# Patient Record
Sex: MALE | Race: WHITE | NOT HISPANIC OR LATINO | Employment: FULL TIME | URBAN - METROPOLITAN AREA
[De-identification: names, ages, dates, MRNs, and addresses within clinical notes are randomized per-mention and may not be internally consistent; named-entity substitution may affect disease eponyms.]

---

## 2017-01-18 ENCOUNTER — COMMUNICATION - HEALTHEAST (OUTPATIENT)
Dept: INTERNAL MEDICINE | Facility: CLINIC | Age: 37
End: 2017-01-18

## 2017-01-18 DIAGNOSIS — E11.9 TYPE 2 DIABETES MELLITUS (H): ICD-10-CM

## 2017-01-22 ENCOUNTER — COMMUNICATION - HEALTHEAST (OUTPATIENT)
Dept: INTERNAL MEDICINE | Facility: CLINIC | Age: 37
End: 2017-01-22

## 2017-01-22 DIAGNOSIS — E11.9 DIABETES (H): ICD-10-CM

## 2017-01-23 ENCOUNTER — COMMUNICATION - HEALTHEAST (OUTPATIENT)
Dept: INTERNAL MEDICINE | Facility: CLINIC | Age: 37
End: 2017-01-23

## 2017-01-23 DIAGNOSIS — E11.9 DIABETES (H): ICD-10-CM

## 2017-01-23 DIAGNOSIS — E11.9 TYPE 2 DIABETES MELLITUS (H): ICD-10-CM

## 2017-03-01 ENCOUNTER — OFFICE VISIT - HEALTHEAST (OUTPATIENT)
Dept: FAMILY MEDICINE | Facility: CLINIC | Age: 37
End: 2017-03-01

## 2017-03-01 ENCOUNTER — COMMUNICATION - HEALTHEAST (OUTPATIENT)
Dept: FAMILY MEDICINE | Facility: CLINIC | Age: 37
End: 2017-03-01

## 2017-03-01 DIAGNOSIS — E11.9 TYPE 2 DIABETES MELLITUS WITHOUT COMPLICATION, WITHOUT LONG-TERM CURRENT USE OF INSULIN (H): ICD-10-CM

## 2017-03-01 DIAGNOSIS — Z23 NEED FOR VACCINATION: ICD-10-CM

## 2017-03-01 LAB — HBA1C MFR BLD: 6.5 % (ref 3.5–6)

## 2017-03-08 ENCOUNTER — OFFICE VISIT - HEALTHEAST (OUTPATIENT)
Dept: NURSING | Facility: CLINIC | Age: 37
End: 2017-03-08

## 2017-03-08 DIAGNOSIS — E11.9 TYPE 2 DIABETES MELLITUS WITHOUT COMPLICATION, WITHOUT LONG-TERM CURRENT USE OF INSULIN (H): ICD-10-CM

## 2017-03-13 ENCOUNTER — COMMUNICATION - HEALTHEAST (OUTPATIENT)
Dept: INTERNAL MEDICINE | Facility: CLINIC | Age: 37
End: 2017-03-13

## 2017-03-13 DIAGNOSIS — E11.9 TYPE 2 DIABETES MELLITUS (H): ICD-10-CM

## 2017-04-10 ENCOUNTER — COMMUNICATION - HEALTHEAST (OUTPATIENT)
Dept: FAMILY MEDICINE | Facility: CLINIC | Age: 37
End: 2017-04-10

## 2017-04-10 DIAGNOSIS — E11.9 DIABETES (H): ICD-10-CM

## 2017-04-17 ENCOUNTER — AMBULATORY - HEALTHEAST (OUTPATIENT)
Dept: FAMILY MEDICINE | Facility: CLINIC | Age: 37
End: 2017-04-17

## 2017-04-17 ENCOUNTER — COMMUNICATION - HEALTHEAST (OUTPATIENT)
Dept: LAB | Facility: CLINIC | Age: 37
End: 2017-04-17

## 2017-04-17 DIAGNOSIS — E11.9 DIABETES (H): ICD-10-CM

## 2017-05-26 ENCOUNTER — COMMUNICATION - HEALTHEAST (OUTPATIENT)
Dept: INTERNAL MEDICINE | Facility: CLINIC | Age: 37
End: 2017-05-26

## 2017-05-26 DIAGNOSIS — E11.9 TYPE 2 DIABETES MELLITUS (H): ICD-10-CM

## 2017-06-05 ENCOUNTER — AMBULATORY - HEALTHEAST (OUTPATIENT)
Dept: LAB | Facility: CLINIC | Age: 37
End: 2017-06-05

## 2017-06-05 DIAGNOSIS — E11.9 DIABETES (H): ICD-10-CM

## 2017-06-05 LAB — HBA1C MFR BLD: 6.4 % (ref 3.5–6)

## 2017-08-15 ENCOUNTER — COMMUNICATION - HEALTHEAST (OUTPATIENT)
Dept: INTERNAL MEDICINE | Facility: CLINIC | Age: 37
End: 2017-08-15

## 2017-08-15 DIAGNOSIS — E11.9 TYPE 2 DIABETES MELLITUS (H): ICD-10-CM

## 2017-09-04 ENCOUNTER — COMMUNICATION - HEALTHEAST (OUTPATIENT)
Dept: SCHEDULING | Facility: CLINIC | Age: 37
End: 2017-09-04

## 2017-09-06 ENCOUNTER — COMMUNICATION - HEALTHEAST (OUTPATIENT)
Dept: NURSING | Facility: CLINIC | Age: 37
End: 2017-09-06

## 2017-09-06 ENCOUNTER — OFFICE VISIT - HEALTHEAST (OUTPATIENT)
Dept: FAMILY MEDICINE | Facility: CLINIC | Age: 37
End: 2017-09-06

## 2017-09-06 DIAGNOSIS — E11.9 TYPE 2 DIABETES MELLITUS WITHOUT COMPLICATION, WITHOUT LONG-TERM CURRENT USE OF INSULIN (H): ICD-10-CM

## 2017-09-06 DIAGNOSIS — G51.0 BELL'S PALSY: ICD-10-CM

## 2017-09-06 LAB — HBA1C MFR BLD: 6.5 % (ref 3.5–6)

## 2017-09-06 ASSESSMENT — MIFFLIN-ST. JEOR: SCORE: 2039.81

## 2017-09-07 LAB — HSV AB SCREEN IGM S BY EIA: REACTIVE

## 2017-09-08 LAB — HSV AB, IGM, S BY IFA - HISTORICAL: NEGATIVE

## 2017-09-12 ENCOUNTER — COMMUNICATION - HEALTHEAST (OUTPATIENT)
Dept: FAMILY MEDICINE | Facility: CLINIC | Age: 37
End: 2017-09-12

## 2017-09-13 ENCOUNTER — OFFICE VISIT - HEALTHEAST (OUTPATIENT)
Dept: FAMILY MEDICINE | Facility: CLINIC | Age: 37
End: 2017-09-13

## 2017-09-13 ENCOUNTER — OFFICE VISIT - HEALTHEAST (OUTPATIENT)
Dept: NURSING | Facility: CLINIC | Age: 37
End: 2017-09-13

## 2017-09-13 DIAGNOSIS — E11.9 TYPE 2 DIABETES MELLITUS WITHOUT COMPLICATION, WITHOUT LONG-TERM CURRENT USE OF INSULIN (H): ICD-10-CM

## 2017-09-13 DIAGNOSIS — G51.0 BELL'S PALSY: ICD-10-CM

## 2017-09-13 ASSESSMENT — MIFFLIN-ST. JEOR: SCORE: 1998.08

## 2017-10-27 ENCOUNTER — COMMUNICATION - HEALTHEAST (OUTPATIENT)
Dept: FAMILY MEDICINE | Facility: CLINIC | Age: 37
End: 2017-10-27

## 2017-10-30 ENCOUNTER — COMMUNICATION - HEALTHEAST (OUTPATIENT)
Dept: FAMILY MEDICINE | Facility: CLINIC | Age: 37
End: 2017-10-30

## 2017-10-30 DIAGNOSIS — E11.9 DIABETES (H): ICD-10-CM

## 2017-11-07 ENCOUNTER — COMMUNICATION - HEALTHEAST (OUTPATIENT)
Dept: INTERNAL MEDICINE | Facility: CLINIC | Age: 37
End: 2017-11-07

## 2017-11-07 DIAGNOSIS — E11.9 TYPE 2 DIABETES MELLITUS (H): ICD-10-CM

## 2018-01-11 ENCOUNTER — OFFICE VISIT - HEALTHEAST (OUTPATIENT)
Dept: FAMILY MEDICINE | Facility: CLINIC | Age: 38
End: 2018-01-11

## 2018-01-11 DIAGNOSIS — J10.1 INFLUENZA A: ICD-10-CM

## 2018-01-11 LAB
FLUAV AG SPEC QL IA: ABNORMAL
FLUBV AG SPEC QL IA: ABNORMAL

## 2018-01-11 ASSESSMENT — MIFFLIN-ST. JEOR: SCORE: 2039.81

## 2018-01-27 ENCOUNTER — OFFICE VISIT - HEALTHEAST (OUTPATIENT)
Dept: FAMILY MEDICINE | Facility: CLINIC | Age: 38
End: 2018-01-27

## 2018-01-27 DIAGNOSIS — R52 BODY ACHES: ICD-10-CM

## 2018-01-27 LAB
FLUAV AG SPEC QL IA: NORMAL
FLUBV AG SPEC QL IA: NORMAL

## 2018-01-29 ENCOUNTER — COMMUNICATION - HEALTHEAST (OUTPATIENT)
Dept: INTERNAL MEDICINE | Facility: CLINIC | Age: 38
End: 2018-01-29

## 2018-01-29 DIAGNOSIS — E11.9 DIABETES (H): ICD-10-CM

## 2018-01-29 DIAGNOSIS — E11.9 TYPE 2 DIABETES MELLITUS (H): ICD-10-CM

## 2018-02-05 ENCOUNTER — OFFICE VISIT - HEALTHEAST (OUTPATIENT)
Dept: NURSING | Facility: CLINIC | Age: 38
End: 2018-02-05

## 2018-02-05 ENCOUNTER — OFFICE VISIT - HEALTHEAST (OUTPATIENT)
Dept: FAMILY MEDICINE | Facility: CLINIC | Age: 38
End: 2018-02-05

## 2018-02-05 DIAGNOSIS — E11.9 TYPE 2 DIABETES MELLITUS WITHOUT COMPLICATION, WITHOUT LONG-TERM CURRENT USE OF INSULIN (H): ICD-10-CM

## 2018-02-05 DIAGNOSIS — E55.9 VITAMIN D DEFICIENCY: ICD-10-CM

## 2018-02-05 LAB
ALBUMIN SERPL-MCNC: 3.9 G/DL (ref 3.5–5)
ALP SERPL-CCNC: 55 U/L (ref 45–120)
ALT SERPL W P-5'-P-CCNC: 55 U/L (ref 0–45)
ANION GAP SERPL CALCULATED.3IONS-SCNC: 13 MMOL/L (ref 5–18)
AST SERPL W P-5'-P-CCNC: 30 U/L (ref 0–40)
BILIRUB SERPL-MCNC: 0.3 MG/DL (ref 0–1)
BUN SERPL-MCNC: 13 MG/DL (ref 8–22)
CALCIUM SERPL-MCNC: 9.5 MG/DL (ref 8.5–10.5)
CHLORIDE BLD-SCNC: 102 MMOL/L (ref 98–107)
CO2 SERPL-SCNC: 25 MMOL/L (ref 22–31)
CREAT SERPL-MCNC: 0.75 MG/DL (ref 0.7–1.3)
CREAT UR-MCNC: 57.3 MG/DL
GFR SERPL CREATININE-BSD FRML MDRD: >60 ML/MIN/1.73M2
GLUCOSE BLD-MCNC: 114 MG/DL (ref 70–125)
HBA1C MFR BLD: 6.5 % (ref 3.5–6)
MICROALBUMIN UR-MCNC: <0.5 MG/DL (ref 0–1.99)
MICROALBUMIN/CREAT UR: NORMAL MG/G
POTASSIUM BLD-SCNC: 4.3 MMOL/L (ref 3.5–5)
PROT SERPL-MCNC: 8.1 G/DL (ref 6–8)
SODIUM SERPL-SCNC: 140 MMOL/L (ref 136–145)

## 2018-02-05 ASSESSMENT — MIFFLIN-ST. JEOR: SCORE: 2046.62

## 2018-02-06 LAB
25(OH)D3 SERPL-MCNC: 30.6 NG/ML (ref 30–80)
25(OH)D3 SERPL-MCNC: 30.6 NG/ML (ref 30–80)

## 2018-02-13 ENCOUNTER — COMMUNICATION - HEALTHEAST (OUTPATIENT)
Dept: FAMILY MEDICINE | Facility: CLINIC | Age: 38
End: 2018-02-13

## 2018-03-27 ENCOUNTER — COMMUNICATION - HEALTHEAST (OUTPATIENT)
Dept: FAMILY MEDICINE | Facility: CLINIC | Age: 38
End: 2018-03-27

## 2018-04-03 ENCOUNTER — COMMUNICATION - HEALTHEAST (OUTPATIENT)
Dept: FAMILY MEDICINE | Facility: CLINIC | Age: 38
End: 2018-04-03

## 2018-04-05 ENCOUNTER — OFFICE VISIT - HEALTHEAST (OUTPATIENT)
Dept: FAMILY MEDICINE | Facility: CLINIC | Age: 38
End: 2018-04-05

## 2018-04-05 DIAGNOSIS — H93.8X2 EAR PRESSURE, LEFT: ICD-10-CM

## 2018-04-05 ASSESSMENT — MIFFLIN-ST. JEOR: SCORE: 2062.95

## 2018-04-06 ENCOUNTER — RECORDS - HEALTHEAST (OUTPATIENT)
Dept: ADMINISTRATIVE | Facility: OTHER | Age: 38
End: 2018-04-06

## 2018-04-25 ENCOUNTER — COMMUNICATION - HEALTHEAST (OUTPATIENT)
Dept: INTERNAL MEDICINE | Facility: CLINIC | Age: 38
End: 2018-04-25

## 2018-04-25 ENCOUNTER — COMMUNICATION - HEALTHEAST (OUTPATIENT)
Dept: FAMILY MEDICINE | Facility: CLINIC | Age: 38
End: 2018-04-25

## 2018-04-25 DIAGNOSIS — E11.9 TYPE 2 DIABETES MELLITUS (H): ICD-10-CM

## 2018-05-03 ENCOUNTER — COMMUNICATION - HEALTHEAST (OUTPATIENT)
Dept: INTERNAL MEDICINE | Facility: CLINIC | Age: 38
End: 2018-05-03

## 2018-05-03 DIAGNOSIS — E11.9 DIABETES (H): ICD-10-CM

## 2018-07-15 ENCOUNTER — COMMUNICATION - HEALTHEAST (OUTPATIENT)
Dept: INTERNAL MEDICINE | Facility: CLINIC | Age: 38
End: 2018-07-15

## 2018-07-15 DIAGNOSIS — E11.9 TYPE 2 DIABETES MELLITUS (H): ICD-10-CM

## 2018-08-02 ENCOUNTER — COMMUNICATION - HEALTHEAST (OUTPATIENT)
Dept: INTERNAL MEDICINE | Facility: CLINIC | Age: 38
End: 2018-08-02

## 2018-08-02 DIAGNOSIS — E11.9 DIABETES (H): ICD-10-CM

## 2018-08-08 ENCOUNTER — OFFICE VISIT - HEALTHEAST (OUTPATIENT)
Dept: PHARMACY | Facility: CLINIC | Age: 38
End: 2018-08-08

## 2018-08-08 ENCOUNTER — AMBULATORY - HEALTHEAST (OUTPATIENT)
Dept: LAB | Facility: CLINIC | Age: 38
End: 2018-08-08

## 2018-08-08 DIAGNOSIS — E55.9 VITAMIN D DEFICIENCY: ICD-10-CM

## 2018-08-08 DIAGNOSIS — E11.9 TYPE 2 DIABETES MELLITUS WITHOUT COMPLICATION, WITHOUT LONG-TERM CURRENT USE OF INSULIN (H): ICD-10-CM

## 2018-08-08 DIAGNOSIS — E11.9 TYPE 2 DIABETES MELLITUS (H): ICD-10-CM

## 2018-08-08 DIAGNOSIS — E11.9 DIABETES (H): ICD-10-CM

## 2018-08-08 LAB
ALBUMIN SERPL-MCNC: 3.7 G/DL (ref 3.5–5)
ALP SERPL-CCNC: 51 U/L (ref 45–120)
ALT SERPL W P-5'-P-CCNC: 19 U/L (ref 0–45)
ANION GAP SERPL CALCULATED.3IONS-SCNC: 11 MMOL/L (ref 5–18)
AST SERPL W P-5'-P-CCNC: 18 U/L (ref 0–40)
BILIRUB SERPL-MCNC: 0.4 MG/DL (ref 0–1)
BUN SERPL-MCNC: 18 MG/DL (ref 8–22)
CALCIUM SERPL-MCNC: 9.1 MG/DL (ref 8.5–10.5)
CHLORIDE BLD-SCNC: 104 MMOL/L (ref 98–107)
CHOLEST SERPL-MCNC: 178 MG/DL
CO2 SERPL-SCNC: 24 MMOL/L (ref 22–31)
CREAT SERPL-MCNC: 0.77 MG/DL (ref 0.7–1.3)
FASTING STATUS PATIENT QL REPORTED: YES
GFR SERPL CREATININE-BSD FRML MDRD: >60 ML/MIN/1.73M2
GLUCOSE BLD-MCNC: 118 MG/DL (ref 70–125)
HBA1C MFR BLD: 6.6 % (ref 3.5–6)
HDLC SERPL-MCNC: 57 MG/DL
LDLC SERPL CALC-MCNC: 112 MG/DL
POTASSIUM BLD-SCNC: 4.3 MMOL/L (ref 3.5–5)
PROT SERPL-MCNC: 7.1 G/DL (ref 6–8)
SODIUM SERPL-SCNC: 139 MMOL/L (ref 136–145)
TRIGL SERPL-MCNC: 43 MG/DL

## 2018-08-16 ENCOUNTER — OFFICE VISIT - HEALTHEAST (OUTPATIENT)
Dept: FAMILY MEDICINE | Facility: CLINIC | Age: 38
End: 2018-08-16

## 2018-08-16 DIAGNOSIS — E11.9 TYPE 2 DIABETES MELLITUS WITHOUT COMPLICATION, WITHOUT LONG-TERM CURRENT USE OF INSULIN (H): ICD-10-CM

## 2018-08-16 ASSESSMENT — MIFFLIN-ST. JEOR: SCORE: 2057.5

## 2018-08-29 ENCOUNTER — OFFICE VISIT - HEALTHEAST (OUTPATIENT)
Dept: FAMILY MEDICINE | Facility: CLINIC | Age: 38
End: 2018-08-29

## 2018-08-29 DIAGNOSIS — R52 BODY ACHES: ICD-10-CM

## 2018-08-29 DIAGNOSIS — G44.009 CLUSTER HEADACHES: ICD-10-CM

## 2018-08-29 LAB
BASOPHILS # BLD AUTO: 0.1 THOU/UL (ref 0–0.2)
BASOPHILS NFR BLD AUTO: 1 % (ref 0–2)
EOSINOPHIL # BLD AUTO: 0.1 THOU/UL (ref 0–0.4)
EOSINOPHIL NFR BLD AUTO: 1 % (ref 0–6)
ERYTHROCYTE [DISTWIDTH] IN BLOOD BY AUTOMATED COUNT: 12.8 % (ref 11–14.5)
HCT VFR BLD AUTO: 45.3 % (ref 40–54)
HGB BLD-MCNC: 15.5 G/DL (ref 14–18)
LYMPHOCYTES # BLD AUTO: 1.2 THOU/UL (ref 0.8–4.4)
LYMPHOCYTES NFR BLD AUTO: 25 % (ref 20–40)
MCH RBC QN AUTO: 29.8 PG (ref 27–34)
MCHC RBC AUTO-ENTMCNC: 34.3 G/DL (ref 32–36)
MCV RBC AUTO: 87 FL (ref 80–100)
MONOCYTES # BLD AUTO: 0.7 THOU/UL (ref 0–0.9)
MONOCYTES NFR BLD AUTO: 16 % (ref 2–10)
NEUTROPHILS # BLD AUTO: 2.7 THOU/UL (ref 2–7.7)
NEUTROPHILS NFR BLD AUTO: 57 % (ref 50–70)
PLATELET # BLD AUTO: 221 THOU/UL (ref 140–440)
PMV BLD AUTO: 7 FL (ref 7–10)
RBC # BLD AUTO: 5.22 MILL/UL (ref 4.4–6.2)
TSH SERPL DL<=0.005 MIU/L-ACNC: 2.01 UIU/ML (ref 0.3–5)
WBC: 4.7 THOU/UL (ref 4–11)

## 2018-08-29 ASSESSMENT — MIFFLIN-ST. JEOR: SCORE: 2049.79

## 2018-08-30 LAB
25(OH)D3 SERPL-MCNC: 29.6 NG/ML (ref 30–80)
25(OH)D3 SERPL-MCNC: 29.6 NG/ML (ref 30–80)
LYME TOTAL ANTIBODY - HISTORICAL: 0.07 INDEX VALUE

## 2018-10-09 ENCOUNTER — COMMUNICATION - HEALTHEAST (OUTPATIENT)
Dept: INTERNAL MEDICINE | Facility: CLINIC | Age: 38
End: 2018-10-09

## 2018-10-09 DIAGNOSIS — E11.9 TYPE 2 DIABETES MELLITUS (H): ICD-10-CM

## 2018-12-11 ENCOUNTER — AMBULATORY - HEALTHEAST (OUTPATIENT)
Dept: FAMILY MEDICINE | Facility: CLINIC | Age: 38
End: 2018-12-11

## 2019-01-15 ENCOUNTER — OFFICE VISIT - HEALTHEAST (OUTPATIENT)
Dept: FAMILY MEDICINE | Facility: CLINIC | Age: 39
End: 2019-01-15

## 2019-01-15 DIAGNOSIS — J01.10 ACUTE FRONTAL SINUSITIS, RECURRENCE NOT SPECIFIED: ICD-10-CM

## 2019-02-13 ENCOUNTER — OFFICE VISIT - HEALTHEAST (OUTPATIENT)
Dept: FAMILY MEDICINE | Facility: CLINIC | Age: 39
End: 2019-02-13

## 2019-02-13 ENCOUNTER — OFFICE VISIT - HEALTHEAST (OUTPATIENT)
Dept: PHARMACY | Facility: CLINIC | Age: 39
End: 2019-02-13

## 2019-02-13 DIAGNOSIS — E11.9 TYPE 2 DIABETES MELLITUS WITHOUT COMPLICATION, WITHOUT LONG-TERM CURRENT USE OF INSULIN (H): ICD-10-CM

## 2019-02-13 DIAGNOSIS — E55.9 VITAMIN D DEFICIENCY: ICD-10-CM

## 2019-02-13 DIAGNOSIS — E66.01 MORBID OBESITY (H): ICD-10-CM

## 2019-02-13 LAB
CREAT UR-MCNC: 78 MG/DL
HBA1C MFR BLD: 7.7 % (ref 3.5–6)
MICROALBUMIN UR-MCNC: <0.5 MG/DL (ref 0–1.99)
MICROALBUMIN/CREAT UR: NORMAL MG/G

## 2019-04-05 ENCOUNTER — COMMUNICATION - HEALTHEAST (OUTPATIENT)
Dept: SCHEDULING | Facility: CLINIC | Age: 39
End: 2019-04-05

## 2019-04-05 ENCOUNTER — OFFICE VISIT - HEALTHEAST (OUTPATIENT)
Dept: FAMILY MEDICINE | Facility: CLINIC | Age: 39
End: 2019-04-05

## 2019-04-05 DIAGNOSIS — M25.511 ACUTE PAIN OF RIGHT SHOULDER: ICD-10-CM

## 2019-04-05 ASSESSMENT — MIFFLIN-ST. JEOR: SCORE: 2055.24

## 2019-05-13 ENCOUNTER — COMMUNICATION - HEALTHEAST (OUTPATIENT)
Dept: FAMILY MEDICINE | Facility: CLINIC | Age: 39
End: 2019-05-13

## 2019-05-18 ENCOUNTER — COMMUNICATION - HEALTHEAST (OUTPATIENT)
Dept: FAMILY MEDICINE | Facility: CLINIC | Age: 39
End: 2019-05-18

## 2019-05-18 DIAGNOSIS — E11.9 TYPE 2 DIABETES MELLITUS WITHOUT COMPLICATION, WITHOUT LONG-TERM CURRENT USE OF INSULIN (H): ICD-10-CM

## 2019-06-17 ENCOUNTER — OFFICE VISIT - HEALTHEAST (OUTPATIENT)
Dept: PHARMACY | Facility: CLINIC | Age: 39
End: 2019-06-17

## 2019-06-17 ENCOUNTER — OFFICE VISIT - HEALTHEAST (OUTPATIENT)
Dept: FAMILY MEDICINE | Facility: CLINIC | Age: 39
End: 2019-06-17

## 2019-06-17 DIAGNOSIS — E11.9 TYPE 2 DIABETES MELLITUS WITHOUT COMPLICATION, WITHOUT LONG-TERM CURRENT USE OF INSULIN (H): ICD-10-CM

## 2019-06-17 DIAGNOSIS — E55.9 VITAMIN D DEFICIENCY: ICD-10-CM

## 2019-06-17 DIAGNOSIS — E66.01 MORBID OBESITY (H): ICD-10-CM

## 2019-06-17 DIAGNOSIS — Z00.00 ANNUAL PHYSICAL EXAM: ICD-10-CM

## 2019-06-17 LAB
ALBUMIN SERPL-MCNC: 4.1 G/DL (ref 3.5–5)
ALP SERPL-CCNC: 50 U/L (ref 45–120)
ALT SERPL W P-5'-P-CCNC: 25 U/L (ref 0–45)
ANION GAP SERPL CALCULATED.3IONS-SCNC: 11 MMOL/L (ref 5–18)
AST SERPL W P-5'-P-CCNC: 22 U/L (ref 0–40)
BILIRUB SERPL-MCNC: 0.4 MG/DL (ref 0–1)
BUN SERPL-MCNC: 13 MG/DL (ref 8–22)
CALCIUM SERPL-MCNC: 9.9 MG/DL (ref 8.5–10.5)
CHLORIDE BLD-SCNC: 103 MMOL/L (ref 98–107)
CHOLEST SERPL-MCNC: 197 MG/DL
CO2 SERPL-SCNC: 25 MMOL/L (ref 22–31)
CREAT SERPL-MCNC: 0.85 MG/DL (ref 0.7–1.3)
CREAT UR-MCNC: 46.2 MG/DL
FASTING STATUS PATIENT QL REPORTED: YES
GFR SERPL CREATININE-BSD FRML MDRD: >60 ML/MIN/1.73M2
GLUCOSE BLD-MCNC: 111 MG/DL (ref 70–125)
HBA1C MFR BLD: 7 % (ref 3.5–6)
HDLC SERPL-MCNC: 64 MG/DL
HGB BLD-MCNC: 14.7 G/DL (ref 14–18)
LDLC SERPL CALC-MCNC: 120 MG/DL
MICROALBUMIN UR-MCNC: <0.5 MG/DL (ref 0–1.99)
MICROALBUMIN/CREAT UR: NORMAL MG/G
POTASSIUM BLD-SCNC: 4.3 MMOL/L (ref 3.5–5)
PROT SERPL-MCNC: 8 G/DL (ref 6–8)
SODIUM SERPL-SCNC: 139 MMOL/L (ref 136–145)
TRIGL SERPL-MCNC: 65 MG/DL
TSH SERPL DL<=0.005 MIU/L-ACNC: 2.07 UIU/ML (ref 0.3–5)

## 2019-06-17 ASSESSMENT — MIFFLIN-ST. JEOR: SCORE: 2052.51

## 2019-06-18 LAB
25(OH)D3 SERPL-MCNC: 37.6 NG/ML (ref 30–80)
25(OH)D3 SERPL-MCNC: 37.6 NG/ML (ref 30–80)

## 2019-08-05 ENCOUNTER — COMMUNICATION - HEALTHEAST (OUTPATIENT)
Dept: FAMILY MEDICINE | Facility: CLINIC | Age: 39
End: 2019-08-05

## 2019-09-26 ENCOUNTER — COMMUNICATION - HEALTHEAST (OUTPATIENT)
Dept: NURSING | Facility: CLINIC | Age: 39
End: 2019-09-26

## 2019-10-07 ENCOUNTER — COMMUNICATION - HEALTHEAST (OUTPATIENT)
Dept: FAMILY MEDICINE | Facility: CLINIC | Age: 39
End: 2019-10-07

## 2019-10-07 DIAGNOSIS — E11.9 TYPE 2 DIABETES MELLITUS (H): ICD-10-CM

## 2019-10-09 ENCOUNTER — AMBULATORY - HEALTHEAST (OUTPATIENT)
Dept: NURSING | Facility: CLINIC | Age: 39
End: 2019-10-09

## 2019-12-02 ENCOUNTER — COMMUNICATION - HEALTHEAST (OUTPATIENT)
Dept: FAMILY MEDICINE | Facility: CLINIC | Age: 39
End: 2019-12-02

## 2019-12-02 DIAGNOSIS — E11.9 DIABETES (H): ICD-10-CM

## 2019-12-31 ENCOUNTER — COMMUNICATION - HEALTHEAST (OUTPATIENT)
Dept: NURSING | Facility: CLINIC | Age: 39
End: 2019-12-31

## 2020-01-15 ENCOUNTER — OFFICE VISIT - HEALTHEAST (OUTPATIENT)
Dept: FAMILY MEDICINE | Facility: CLINIC | Age: 40
End: 2020-01-15

## 2020-01-15 DIAGNOSIS — E11.9 TYPE 2 DIABETES MELLITUS (H): ICD-10-CM

## 2020-01-15 DIAGNOSIS — E66.01 MORBID OBESITY (H): ICD-10-CM

## 2020-01-15 LAB
ALBUMIN SERPL-MCNC: 4.1 G/DL (ref 3.5–5)
ALP SERPL-CCNC: 56 U/L (ref 45–120)
ALT SERPL W P-5'-P-CCNC: 28 U/L (ref 0–45)
ANION GAP SERPL CALCULATED.3IONS-SCNC: 11 MMOL/L (ref 5–18)
AST SERPL W P-5'-P-CCNC: 25 U/L (ref 0–40)
BILIRUB SERPL-MCNC: 0.6 MG/DL (ref 0–1)
BUN SERPL-MCNC: 14 MG/DL (ref 8–22)
CALCIUM SERPL-MCNC: 9.6 MG/DL (ref 8.5–10.5)
CHLORIDE BLD-SCNC: 103 MMOL/L (ref 98–107)
CHOLEST SERPL-MCNC: 194 MG/DL
CO2 SERPL-SCNC: 26 MMOL/L (ref 22–31)
CREAT SERPL-MCNC: 0.89 MG/DL (ref 0.7–1.3)
CREAT UR-MCNC: 133.3 MG/DL
FASTING STATUS PATIENT QL REPORTED: YES
GFR SERPL CREATININE-BSD FRML MDRD: >60 ML/MIN/1.73M2
GLUCOSE BLD-MCNC: 128 MG/DL (ref 70–125)
HDLC SERPL-MCNC: 67 MG/DL
LDLC SERPL CALC-MCNC: 114 MG/DL
MICROALBUMIN UR-MCNC: 0.74 MG/DL (ref 0–1.99)
MICROALBUMIN/CREAT UR: 5.6 MG/G
POTASSIUM BLD-SCNC: 4.7 MMOL/L (ref 3.5–5)
PROT SERPL-MCNC: 7.8 G/DL (ref 6–8)
SODIUM SERPL-SCNC: 140 MMOL/L (ref 136–145)
TRIGL SERPL-MCNC: 64 MG/DL

## 2020-02-05 ENCOUNTER — COMMUNICATION - HEALTHEAST (OUTPATIENT)
Dept: FAMILY MEDICINE | Facility: CLINIC | Age: 40
End: 2020-02-05

## 2020-02-05 DIAGNOSIS — E11.9 TYPE 2 DIABETES MELLITUS WITHOUT COMPLICATION, WITHOUT LONG-TERM CURRENT USE OF INSULIN (H): ICD-10-CM

## 2020-03-16 ENCOUNTER — COMMUNICATION - HEALTHEAST (OUTPATIENT)
Dept: FAMILY MEDICINE | Facility: CLINIC | Age: 40
End: 2020-03-16

## 2020-03-23 ENCOUNTER — COMMUNICATION - HEALTHEAST (OUTPATIENT)
Dept: PHARMACY | Facility: CLINIC | Age: 40
End: 2020-03-23

## 2020-03-30 ENCOUNTER — AMBULATORY - HEALTHEAST (OUTPATIENT)
Dept: PHARMACY | Facility: CLINIC | Age: 40
End: 2020-03-30

## 2020-03-30 DIAGNOSIS — E11.9 TYPE 2 DIABETES MELLITUS WITHOUT COMPLICATION, WITHOUT LONG-TERM CURRENT USE OF INSULIN (H): ICD-10-CM

## 2020-03-30 DIAGNOSIS — E55.9 VITAMIN D DEFICIENCY: ICD-10-CM

## 2020-07-10 ENCOUNTER — COMMUNICATION - HEALTHEAST (OUTPATIENT)
Dept: FAMILY MEDICINE | Facility: CLINIC | Age: 40
End: 2020-07-10

## 2020-07-14 ENCOUNTER — COMMUNICATION - HEALTHEAST (OUTPATIENT)
Dept: NURSING | Facility: CLINIC | Age: 40
End: 2020-07-14

## 2020-07-15 ENCOUNTER — COMMUNICATION - HEALTHEAST (OUTPATIENT)
Dept: NURSING | Facility: CLINIC | Age: 40
End: 2020-07-15

## 2020-07-15 ENCOUNTER — OFFICE VISIT - HEALTHEAST (OUTPATIENT)
Dept: FAMILY MEDICINE | Facility: CLINIC | Age: 40
End: 2020-07-15

## 2020-07-15 DIAGNOSIS — E11.9 TYPE 2 DIABETES MELLITUS WITHOUT COMPLICATION, WITHOUT LONG-TERM CURRENT USE OF INSULIN (H): ICD-10-CM

## 2020-07-15 LAB
ANION GAP SERPL CALCULATED.3IONS-SCNC: 10 MMOL/L (ref 5–18)
BUN SERPL-MCNC: 14 MG/DL (ref 8–22)
CALCIUM SERPL-MCNC: 9.2 MG/DL (ref 8.5–10.5)
CHLORIDE BLD-SCNC: 102 MMOL/L (ref 98–107)
CO2 SERPL-SCNC: 25 MMOL/L (ref 22–31)
CREAT SERPL-MCNC: 0.85 MG/DL (ref 0.7–1.3)
CREAT UR-MCNC: 68.4 MG/DL
CREAT UR-MCNC: 68.4 MG/DL
GFR SERPL CREATININE-BSD FRML MDRD: >60 ML/MIN/1.73M2
GLUCOSE BLD-MCNC: 120 MG/DL (ref 70–125)
HBA1C MFR BLD: 6.7 % (ref 3.5–6)
MICROALBUMIN UR-MCNC: <0.5 MG/DL (ref 0–1.99)
MICROALBUMIN/CREAT UR: NORMAL MG/G{CREAT}
POTASSIUM BLD-SCNC: 4 MMOL/L (ref 3.5–5)
PROTEIN, RANDOM URINE - HISTORICAL: <7 MG/DL
PROTEIN/CREAT RATIO, RANDOM UR: NORMAL
SODIUM SERPL-SCNC: 137 MMOL/L (ref 136–145)

## 2020-09-17 ENCOUNTER — OFFICE VISIT - HEALTHEAST (OUTPATIENT)
Dept: FAMILY MEDICINE | Facility: CLINIC | Age: 40
End: 2020-09-17

## 2020-09-17 DIAGNOSIS — L72.3 SEBACEOUS CYST: ICD-10-CM

## 2020-09-17 DIAGNOSIS — Z23 NEED FOR VACCINATION: ICD-10-CM

## 2020-11-17 ENCOUNTER — COMMUNICATION - HEALTHEAST (OUTPATIENT)
Dept: FAMILY MEDICINE | Facility: CLINIC | Age: 40
End: 2020-11-17

## 2020-12-22 ENCOUNTER — OFFICE VISIT - HEALTHEAST (OUTPATIENT)
Dept: FAMILY MEDICINE | Facility: CLINIC | Age: 40
End: 2020-12-22

## 2020-12-22 DIAGNOSIS — E11.9 TYPE 2 DIABETES MELLITUS WITHOUT COMPLICATION, WITHOUT LONG-TERM CURRENT USE OF INSULIN (H): ICD-10-CM

## 2020-12-22 LAB — HBA1C MFR BLD: 7.1 %

## 2021-02-05 ENCOUNTER — COMMUNICATION - HEALTHEAST (OUTPATIENT)
Dept: FAMILY MEDICINE | Facility: CLINIC | Age: 41
End: 2021-02-05

## 2021-02-05 DIAGNOSIS — E66.01 MORBID OBESITY (H): ICD-10-CM

## 2021-02-05 DIAGNOSIS — E11.9 TYPE 2 DIABETES MELLITUS (H): ICD-10-CM

## 2021-02-22 ENCOUNTER — COMMUNICATION - HEALTHEAST (OUTPATIENT)
Dept: FAMILY MEDICINE | Facility: CLINIC | Age: 41
End: 2021-02-22

## 2021-02-22 DIAGNOSIS — E66.01 MORBID OBESITY (H): ICD-10-CM

## 2021-03-19 ENCOUNTER — COMMUNICATION - HEALTHEAST (OUTPATIENT)
Dept: FAMILY MEDICINE | Facility: CLINIC | Age: 41
End: 2021-03-19

## 2021-03-19 DIAGNOSIS — E11.9 TYPE 2 DIABETES MELLITUS WITHOUT COMPLICATION, WITHOUT LONG-TERM CURRENT USE OF INSULIN (H): ICD-10-CM

## 2021-03-24 ENCOUNTER — COMMUNICATION - HEALTHEAST (OUTPATIENT)
Dept: FAMILY MEDICINE | Facility: CLINIC | Age: 41
End: 2021-03-24

## 2021-03-24 DIAGNOSIS — E11.9 TYPE 2 DIABETES MELLITUS WITHOUT COMPLICATION, WITHOUT LONG-TERM CURRENT USE OF INSULIN (H): ICD-10-CM

## 2021-05-05 ENCOUNTER — COMMUNICATION - HEALTHEAST (OUTPATIENT)
Dept: FAMILY MEDICINE | Facility: CLINIC | Age: 41
End: 2021-05-05

## 2021-05-05 DIAGNOSIS — E11.9 TYPE 2 DIABETES MELLITUS (H): ICD-10-CM

## 2021-05-05 DIAGNOSIS — E66.01 MORBID OBESITY (H): ICD-10-CM

## 2021-05-26 ENCOUNTER — RECORDS - HEALTHEAST (OUTPATIENT)
Dept: ADMINISTRATIVE | Facility: CLINIC | Age: 41
End: 2021-05-26

## 2021-05-27 NOTE — TELEPHONE ENCOUNTER
Pt says his Rt arm feeling a little tingly in fingers and upper arm elbow to shoulder and bicep the pain is a 6/10 and hard to lift arm above shoulder level x 2 days  The pain is constant  Tingling comes and goes in ring finger and middle finger mostly  600 mg Ibuprofen doesn't help. Pt took it a few times now without any benefit  Tried ice and didn't help  No injury noted  No redness or swelling  No chest pains noted  Recommend appt and transferred to scheduling  No opening at [facility]-ok to [facility] too    Anabelle Christopher, RN [facility] RN Triage      Reason for Disposition    Patient wants to be seen    MODERATE pain (e.g., interferes with normal activities) and present > 3 days    Protocols used: SHOULDER PAIN-A-OH

## 2021-05-27 NOTE — PROGRESS NOTES
1. Acute pain of right shoulder  XR Shoulder Right 2 or More VWS        Plan: I reassured him that I do not see anything bony on the x-ray that is of concern.  I think he might have just had a strain of his rotator cuff.  I showed him some pendulum exercises that he can do on a daily basis.  He should continue to take 6-800 mg of ibuprofen up to 3 times daily.  He should use heat on the shoulder as well.  I told him that is not getting better in a couple of weeks he could consider letting us know and he can either let promise or myself know and would consider doing an MRI at that time to further assess his rotator cuff.  I think it will be a lot better by then however but he will let us know if he seems to be getting worse.    Subjective: 38-year-old male who is here today with some shoulder pain on the right side.  He states this been bothering him for a couple of days.  He notes of no specific injury in the last couple of days.  It hurts more when he lays on that right side.  He is left-handed.  He states that the pain is mostly the anterior shoulder but will radiate down toward his elbow.  Once in a while, he will get some numbness and tingling in his third and fourth fingers but that is pretty rare.  He has been using some Tylenol and ibuprofen with some moderate success but it still hurts quite a bit.  He is use ice and not heat.  He has been trying to be gentle with his range of motion.  He cannot sleep on that right side.  Interestingly the only change is that his glucose monitor that he wears transdermally was put on that right triceps area about the time that this started but I have a hard time putting that together well for why that would be giving him pain     objective: Well-appearing male in no acute distress.  Vital signs as noted.  Chest clear to auscultation.  Heart regular rate and rhythm.  Right shoulder shows a bit of weakness to the supraspinatus test, as well as to external rotation.  His range  of motion is limited by pain.  Passive range of motion is pretty good however.    X-ray read by me and shows no fracture dislocation no bony abnormality or separation.    Xr Shoulder Right 2 Or More Vws    Result Date: 4/5/2019  EXAM: XR SHOULDER RIGHT 2 OR MORE VWS LOCATION: [facility] DATE/TIME: 4/5/2019 9:28 AM INDICATION: R shoulder pain x 2 days, severe, no trauma known COMPARISON: None. FINDINGS: Normal alignment without a fracture or dislocation. No degenerative changes. Adjacent chest wall is normal.

## 2021-05-28 NOTE — TELEPHONE ENCOUNTER
Refill Approved    Rx renewed per Medication Renewal Policy. Medication was last renewed on 2/13/19.    Julia Evans, [facility] Triage/Med Refill 5/20/2019     Requested Prescriptions   Pending Prescriptions Disp Refills     FREESTYLE VARUN 14 DAY READER Misc [Pharmacy Med Name: FREESTYLE VARUN 14DAY READER DEVICE]  0     Sig: USE ONE APPLICATION AS DIRECTED DAILY       Diabetic Supplies Refill Protocol Passed - 5/18/2019  7:42 PM        Passed - Visit with PCP or prescribing provider visit in last 6 months     Last office visit with prescriber/PCP: 2/13/2019 Lucia Werner FNP OR joshua dept: 2/13/2019 Lucia Werner FNP OR same specialty: 4/5/2019 Dudley Victoria MD  Last physical: Visit date not found Last MTM visit: Visit date not found   Next visit within 3 mo: Visit date not found  Next physical within 3 mo: Visit date not found  Prescriber OR PCP: OLIVE Zurita  Last diagnosis associated with med order: 1. Type 2 diabetes mellitus without complication, without long-term current use of insulin (H)  - FREESTYLE VARUN 14 DAY READER Misc [Pharmacy Med Name: FREESTYLE VARUN 14DAY READER DEVICE]; USE ONE APPLICATION AS DIRECTED DAILY; Refill: 0    If protocol passes may refill for 12 months if within 3 months of last provider visit (or a total of 15 months).             Passed - A1C in last 6 months     Hemoglobin A1c   Date Value Ref Range Status   02/13/2019 7.7 (H) 3.5 - 6.0 % Final

## 2021-05-29 NOTE — PROGRESS NOTES
MTM Follow Up Encounter  Assessment & Plan                                                     1. Type 2 diabetes mellitus   Due for A1c today. Goal of <7%. Currently on metformin and GLP-1 agonist with improved reported BG within goal range now using CGM. There is room to increase Trulicity dose if needed. Congratulated him on his lifestyle modifications and weight loss.   Blood pressure is well controlled and meeting goal of <140/90 mm Hg per JNC-8 hypertension guidelines without medication. No microalbuminuria. No statin indicated as patient is <40 years old and previous LDL of 115 mg/dl.   Plan   1. Check A1c.     2. Vitamin D deficiency  Most recent level close to goal range. Now on recommended supplementation dose. Will recheck level today.   Plan   1. Check vitamin D.      Follow Up  Return in about 6 months (around 12/17/2019).      Subjective & Objective                                                       Eddie Kelley is a 38 y.o. male coming in for a follow up visit for Medication Therapy Management. He was referred to me from OLIVE Zurita.    Chief Complaint: Diabetes follow up  Medication Adherence/Access: Good; no issues identified.     1. Type 2 diabetes mellitus   Rodriguez is taking metformin ER 2000 mg daily and Trulicity 0.75 mg SQ weekly. Denies missed doses. His last A1c took a jump up, likely due to weight gain at the time. He has lost weight and is working out more. He is now using FreeStyle Allison CGM and really liking it. No hypoglycemia. Average BG of 110-130s. Lowest of 90-99.   He was previously on Bydureon, but reacted to the microspheres in the formulation.     Lab Results   Component Value Date    HGBA1C 7.7 (H) 02/13/2019    HGBA1C 6.6 (H) 08/08/2018    HGBA1C 6.5 (H) 02/05/2018     Lab Results   Component Value Date    MICROALBUR <0.50 02/13/2019    LDLCALC 112 08/08/2018    CREATININE 0.77 08/08/2018       2. Vitamin D deficiency  Rodriguez takes vitamin D3 2,000 IU daily.  He also takes a multivitmain every other day.     Vitamin D, Total (25-Hydroxy)   Date Value Ref Range Status   2018 29.6 (L) 30.0 - 80.0 ng/mL Final         PMH: reviewed in EPIC   Allergies/ADRs: reviewed in EPIC   Alcohol: reviewed in EPIC   Tobacco:   Social History     Tobacco Use   Smoking Status Former Smoker     Years: 10.00     Last attempt to quit: 2005     Years since quittin.4   Smokeless Tobacco Former User     Today's Vitals:   BP Readings from Last 3 Encounters:   19 126/76   19 118/82   19 118/76     Pulse Readings from Last 3 Encounters:   19 (!) 59   19 71   19 68     Wt Readings from Last 3 Encounters:   19 (!) 247 lb (112 kg)   19 (!) 247 lb 9.6 oz (112.3 kg)   19 (!) 256 lb 2 oz (116.2 kg)     ----------------    Much or all of the text in this note was generated through the use of Dragon Dictate voice-to-text software. Errors in spelling or words which seem out of context are unintentional. Sound alike errors, in particular, may have escaped editing.    The patient was given a summary of these recommendations via [email or web address]    I spent 15 minutes with this patient today;   All changes were made via collaborative practice agreement with OLIVE Zurita. A copy of the visit note was provided to the patient's provider.     Giselle Mayes, PharmD, BCACP  Medication Management Pharmacist  [facility]          Current Outpatient Medications   Medication Sig Dispense Refill     cholecalciferol, vitamin D3, 1,000 unit tablet Take 2,000 Units by mouth daily.             dulaglutide (TRULICITY) 0.75 mg/0.5 mL PnIj Inject 0.75 mg under the skin once a week. 6 mL 3     flash glucose sensor (FREESTYLE VARUN 14 DAY SENSOR) Kit Use 1 application As Directed every 8 (eight) hours. 2 kit 11     FREESTYLE VARUN 14 DAY READER Misc USE ONE APPLICATION AS DIRECTED DAILY 1 each 3     metFORMIN (GLUCOPHAGE-XR) 500 MG 24 hr  tablet Take 2 tablets (1,000 mg total) by mouth 2 (two) times a day. 360 tablet 3     MULTIVITAMIN ORAL Take 1 tablet by mouth every other day.       No current facility-administered medications for this visit.

## 2021-05-30 VITALS — WEIGHT: 237.6 LBS | BODY MASS INDEX: 33.14 KG/M2

## 2021-05-31 VITALS — WEIGHT: 235 LBS | HEIGHT: 71 IN | BODY MASS INDEX: 32.9 KG/M2

## 2021-05-31 VITALS — WEIGHT: 240 LBS | BODY MASS INDEX: 33.47 KG/M2

## 2021-05-31 VITALS — WEIGHT: 244.2 LBS | HEIGHT: 71 IN | BODY MASS INDEX: 34.19 KG/M2

## 2021-05-31 VITALS — HEIGHT: 71 IN | BODY MASS INDEX: 34.19 KG/M2 | WEIGHT: 244.2 LBS

## 2021-05-31 VITALS — BODY MASS INDEX: 34.4 KG/M2 | WEIGHT: 245.7 LBS | HEIGHT: 71 IN

## 2021-06-01 VITALS — WEIGHT: 248.1 LBS | BODY MASS INDEX: 34.73 KG/M2 | HEIGHT: 71 IN

## 2021-06-01 VITALS — WEIGHT: 246.4 LBS | HEIGHT: 71 IN | BODY MASS INDEX: 34.5 KG/M2

## 2021-06-01 VITALS — WEIGHT: 248.1 LBS | BODY MASS INDEX: 34.6 KG/M2

## 2021-06-01 VITALS — WEIGHT: 249.3 LBS | BODY MASS INDEX: 34.9 KG/M2 | HEIGHT: 71 IN

## 2021-06-02 VITALS — BODY MASS INDEX: 35.18 KG/M2 | WEIGHT: 252.25 LBS

## 2021-06-02 VITALS — BODY MASS INDEX: 34.66 KG/M2 | WEIGHT: 247.6 LBS | HEIGHT: 71 IN

## 2021-06-02 VITALS — BODY MASS INDEX: 35.72 KG/M2 | WEIGHT: 256.13 LBS

## 2021-06-02 NOTE — TELEPHONE ENCOUNTER
Refill Approved    Rx renewed per Medication Renewal Policy. Medication was last renewed on 8/8/18.    Julia Evans, [facility] Triage/Med Refill 10/7/2019     Requested Prescriptions   Pending Prescriptions Disp Refills     dulaglutide (TRULICITY) 0.75 mg/0.5 mL PnIj 6 mL 3     Sig: Inject 0.75 mg under the skin once a week.       Insulin/GLP-1 Refill Protocol Passed - 10/7/2019  9:50 AM        Passed - Visit with PCP or prescribing provider visit in last 6 months     Last office visit with prescriber/PCP: Visit date not found OR same dept: 2/13/2019 Lucia Werner FNP OR same specialty: 4/5/2019 Dudley Victoria MD Last physical: 6/17/2019 Last MTM visit: Visit date not found     Next appt within 3 mo: Visit date not found  Next physical within 3 mo: Visit date not found  Prescriber OR PCP: OLIVE Zurita  Last diagnosis associated with med order: 1. Type 2 diabetes mellitus (H)  - dulaglutide (TRULICITY) 0.75 mg/0.5 mL PnIj; Inject 0.75 mg under the skin once a week.  Dispense: 6 mL; Refill: 3    If protocol passes may refill for 6 months if within 3 months of last provider visit (or a total of 9 months).              Passed - A1C in last 6 months     Hemoglobin A1c   Date Value Ref Range Status   06/17/2019 7.0 (H) 3.5 - 6.0 % Final               Passed - Microalbumin in last year     Microalbumin, Random Urine   Date Value Ref Range Status   06/17/2019 <0.50 0.00 - 1.99 mg/dL Final                  Passed - Blood pressure in last year     BP Readings from Last 1 Encounters:   06/17/19 126/76             Passed - Creatinine done in last year     Creatinine   Date Value Ref Range Status   06/17/2019 0.85 0.70 - 1.30 mg/dL Final

## 2021-06-03 VITALS — HEIGHT: 71 IN | WEIGHT: 247 LBS | BODY MASS INDEX: 34.58 KG/M2

## 2021-06-04 VITALS
HEART RATE: 68 BPM | SYSTOLIC BLOOD PRESSURE: 124 MMHG | WEIGHT: 246 LBS | BODY MASS INDEX: 34.31 KG/M2 | DIASTOLIC BLOOD PRESSURE: 72 MMHG

## 2021-06-04 VITALS
SYSTOLIC BLOOD PRESSURE: 137 MMHG | WEIGHT: 246 LBS | OXYGEN SATURATION: 97 % | HEART RATE: 68 BPM | DIASTOLIC BLOOD PRESSURE: 85 MMHG | BODY MASS INDEX: 34.31 KG/M2

## 2021-06-04 VITALS
WEIGHT: 245.19 LBS | SYSTOLIC BLOOD PRESSURE: 135 MMHG | DIASTOLIC BLOOD PRESSURE: 74 MMHG | HEART RATE: 60 BPM | BODY MASS INDEX: 34.2 KG/M2

## 2021-06-05 VITALS
HEART RATE: 85 BPM | WEIGHT: 251 LBS | BODY MASS INDEX: 35.01 KG/M2 | DIASTOLIC BLOOD PRESSURE: 82 MMHG | SYSTOLIC BLOOD PRESSURE: 130 MMHG

## 2021-06-05 NOTE — PROGRESS NOTES
Assessment:   1. Type 2 diabetes mellitus (H)   Diabetes mellitus Type II, under adequate control.   - dulaglutide (TRULICITY) 0.75 mg/0.5 mL PnIj; Inject 0.75 mg under the skin once a week.  Dispense: 6 mL; Refill: 3  - Microalbumin, Random Urine  - Diabetes foot exam  - Comprehensive Metabolic Panel  - Lipid Cascade  - Microalbumin, Random Urine    2. Morbid obesity (H)  The following high BMI interventions were performed this visit: encouragement to exercise and dietary management education, guidance, and counseling  - metFORMIN (GLUCOPHAGE-XR) 500 MG 24 hr tablet; Take 2 tablets (1,000 mg total) by mouth 2 (two) times a day.  Dispense: 360 tablet; Refill: 3  - dulaglutide (TRULICITY) 0.75 mg/0.5 mL PnIj; Inject 0.75 mg under the skin once a week.  Dispense: 6 mL; Refill: 3    Plan:   Discussed general issues about diabetes pathophysiology and management.  Suggested low cholesterol diet.  Encouraged aerobic exercise.  Discussed foot care.  Reminded to get yearly retinal exam.  Discussed ways to avoid symptomatic hypoglycemia.  Discussed sick day management.  Continued metformin; see  medication orders.  Labs: fasting blood sugar, fasting lipid panel, hemoglobin A1C and microalbuminuria.  Reminded to bring in blood sugar diary at next visit.  Follow up in 6 months or as needed.     Subjective:   Eddie Kelley is an 39 y.o. male who presents for follow up of diabetes. Current symptoms include: none. Patient denies foot ulcerations, hyperglycemia, hypoglycemia , increased appetite, nausea, paresthesia of the feet, polydipsia, polyuria, visual disturbances, vomiting and weight loss. Evaluation to date has included: fasting blood sugar, fasting lipid panel, hemoglobin A1C and microalbuminuria. Home sugars: BGs consistently in an acceptable range. Current treatments: Continued metformin which has been effective. Last dilated eye exam 2019.    The following portions of the patient's history were reviewed and  updated as appropriate: allergies, current medications, past family history, past medical history, past social history, past surgical history and problem list.    Review of Systems  A 12 point comprehensive review of systems was negative except as noted.      Objective:   /74   Pulse 60   Wt (!) 245 lb 3 oz (111.2 kg)   BMI 34.20 kg/m    General appearance: alert, appears stated age and cooperative  Head: Normocephalic, without obvious abnormality, atraumatic  Eyes: conjunctivae/corneas clear. PERRL, EOM's intact. Fundi benign.  Lungs: clear to auscultation bilaterally  Heart: regular rate and rhythm, S1, S2 normal, no murmur, click, rub or gallop  Extremities: extremities normal, atraumatic, no cyanosis or edema  Pulses: 2+ and symmetric  Skin: Skin color, texture, turgor normal. No rashes or lesions  Lymph nodes: Cervical, supraclavicular, and axillary nodes normal.  Neurologic: Grossly normal    Laboratory:  No components found for: A1C

## 2021-06-05 NOTE — TELEPHONE ENCOUNTER
RN cannot approve Refill Request    RN can NOT refill this medication Protocol failed and NO refill given       Julia Evans, [facility] Triage/Med Refill 2/5/2020    Requested Prescriptions   Pending Prescriptions Disp Refills     flash glucose sensor Kit [Pharmacy Med Name: FREESTYLE VARUN 14DAY SEN/FLSH KIT] 2 kit 11     Sig: USE 1 APPLICATION AS DIRECTED       Diabetic Supplies Refill Protocol Failed - 2/5/2020  2:06 PM        Failed - A1C in last 6 months     Hemoglobin A1c   Date Value Ref Range Status   06/17/2019 7.0 (H) 3.5 - 6.0 % Final               Passed - Visit with PCP or prescribing provider visit in last 6 months     Last office visit with prescriber/PCP: 1/15/2020 Lucia Werner FNP OR same dept: 1/15/2020 Lucia Werner FNP OR same specialty: 1/15/2020 Lucia Werner FNP  Last physical: 6/17/2019 Last MTM visit: Visit date not found   Next visit within 3 mo: Visit date not found  Next physical within 3 mo: Visit date not found  Prescriber OR PCP: OLIVE Zurita  Last diagnosis associated with med order: 1. Type 2 diabetes mellitus without complication, without long-term current use of insulin (H)  - flash glucose sensor Kit [Pharmacy Med Name: FREESTYLE VARUN 14DAY SEN/FLSH KIT]; USE 1 APPLICATION AS DIRECTED  Dispense: 2 kit; Refill: 11    If protocol passes may refill for 12 months if within 3 months of last provider visit (or a total of 15 months).

## 2021-06-07 NOTE — PROGRESS NOTES
MTM Initial Encounter  Assessment & Plan                                                     1. Type 2 diabetes mellitus   Most recent A1c of 7%, close to goal of <7% per ADA guidelines. Currently on metformin and GLP-1 agonist with improved reported BG within goal range now using CGM. There is room to increase Trulicity dose if needed or consider change to Ozempic.   Blood pressure is well controlled and meeting goal of <140/90 mm Hg per JNC-8 hypertension guidelines without medication. No microalbuminuria. No statin indicated as patient is <40 years old and previous LDL of 115 mg/dl.     2. Vitamin D deficiency  Most recent level within goal range on current supplementation.       Follow Up  Return in about 6 months (around 9/30/2020).      Subjective & Objective                                                       Eddie Kelley is a 39 y.o. male called for an initial visit for Medication Therapy Management. He was referred to me from OLIVE Zurita.    Chief Complaint: Diabetes  Medication Adherence/Access: Good; no issues identified.     1. Type 2 diabetes mellitus   Rodriguez is taking metformin ER 2000 mg daily and Trulicity 0.75 mg SQ weekly. Denies missed doses. His weight has been steady. He is now using FreeStyle Allison CGM and really liking it. No hypoglycemia. Average BG of 100-140. Highest readings of 150.  He was previously on Bydureon, but reacted to the microspheres in the formulation.     Lab Results   Component Value Date    HGBA1C 7.0 (H) 06/17/2019    HGBA1C 7.7 (H) 02/13/2019    HGBA1C 6.6 (H) 08/08/2018     Lab Results   Component Value Date    MICROALBUR 0.74 01/15/2020    LDLCALC 114 01/15/2020    CREATININE 0.89 01/15/2020       2. Vitamin D deficiency  Rodriguez takes vitamin D3 2,000 IU daily. He also takes a multivitmain every other day.     Vitamin D, Total (25-Hydroxy)   Date Value Ref Range Status   06/17/2019 37.6 30.0 - 80.0 ng/mL Final         PMH: reviewed in EPIC    Allergies/ADRs: reviewed in EPIC   Alcohol: reviewed in EPIC   Tobacco:   Social History     Tobacco Use   Smoking Status Former Smoker     Years: 10.00     Last attempt to quit: 2005     Years since quitting: 15.2   Smokeless Tobacco Former User     Vitals:   BP Readings from Last 3 Encounters:   01/15/20 135/74   06/17/19 126/76   04/05/19 118/82     Pulse Readings from Last 3 Encounters:   01/15/20 60   06/17/19 (!) 59   04/05/19 71     Wt Readings from Last 3 Encounters:   01/15/20 (!) 245 lb 3 oz (111.2 kg)   06/17/19 (!) 247 lb (112 kg)   04/05/19 (!) 247 lb 9.6 oz (112.3 kg)     ----------------    The patient was given a summary of these recommendations via [employer]    I spent 30 minutes with this patient today;   All changes were made via collaborative practice agreement with OLIVE Zurita. A copy of the visit note was provided to the patient's provider.     Giselle Mayes, PharmD, BCACP  Medication Management (MTM) Pharmacist  [facility]        Current Outpatient Medications   Medication Sig Dispense Refill     cholecalciferol, vitamin D3, 1,000 unit tablet Take 2,000 Units by mouth daily.             dulaglutide (TRULICITY) 0.75 mg/0.5 mL PnIj Inject 0.75 mg under the skin once a week. 6 mL 3     flash glucose sensor Kit USE 1 APPLICATION AS DIRECTED 2 kit 11     FREESTYLE VARUN 14 DAY READER Misc USE ONE APPLICATION AS DIRECTED DAILY 1 each 3     metFORMIN (GLUCOPHAGE-XR) 500 MG 24 hr tablet Take 2 tablets (1,000 mg total) by mouth 2 (two) times a day. 360 tablet 3     MULTIVITAMIN ORAL Take 1 tablet by mouth every other day.       No current facility-administered medications for this visit.

## 2021-06-08 ENCOUNTER — COMMUNICATION - HEALTHEAST (OUTPATIENT)
Dept: FAMILY MEDICINE | Facility: CLINIC | Age: 41
End: 2021-06-08

## 2021-06-08 DIAGNOSIS — E66.01 MORBID OBESITY (H): ICD-10-CM

## 2021-06-08 DIAGNOSIS — E11.9 TYPE 2 DIABETES MELLITUS (H): ICD-10-CM

## 2021-06-09 NOTE — PROGRESS NOTES
Assessment:   1. Type 2 diabetes mellitus without complication, without long-term current use of insulin (H)  Under good control.   - Protein/Creatinine Ratio, Urine Random  - Basic metabolic panel  - Hemoglobin A1c  - Microalbumin, Random Urine  - Diabetes foot exam     Plan:   Discussed general issues about diabetes pathophysiology and management.  Suggested low cholesterol diet.  Encouraged aerobic exercise.  Discussed foot care.  Reminded to get yearly retinal exam.  Discussed ways to avoid symptomatic hypoglycemia.  Labs: fasting blood sugar, fasting lipid panel, hemoglobin A1C and microalbuminuria.  Reminded to bring in blood sugar diary at next visit.  Follow up in 3 months or as needed.     Subjective:   Eddie Kelley is a 39 y.o. male who presents for follow up of diabetes.. Current symptoms include: none. Patient denies foot ulcerations, hyperglycemia, hypoglycemia , increased appetite, nausea, polydipsia, visual disturbances, vomiting and weight loss. Evaluation to date has been: fasting blood sugar, fasting lipid panel, hemoglobin A1C and microalbuminuria. Home sugars: BGs consistently in an acceptable range. Current treatments: Continued metformin which has been effective and Continued statin which has been effective. Last dilated eye exam 2020.    The following portions of the patient's history were reviewed and updated as appropriate: allergies, current medications, past family history, past medical history, past social history, past surgical history and problem list.    Review of Systems  A 12 point comprehensive review of systems was negative except as noted.      Objective:   /72 (Patient Site: Right Arm, Patient Position: Sitting, Cuff Size: Adult Large)   Pulse 68   Wt (!) 246 lb (111.6 kg)   BMI 34.31 kg/m    General appearance: alert, appears stated age and cooperative  Extremities: extremities normal, atraumatic, no cyanosis or edema and varicose veins noted  Pulses: 2+ and  symmetric  Skin: Skin color, texture, turgor normal. No rashes or lesions  Lymph nodes: Cervical, supraclavicular, and axillary nodes normal.  Neurologic: Grossly normal      [unfilled]    Patient was evaluated for proper footwear and sizing.    Laboratory:  No components found for: A1C

## 2021-06-09 NOTE — PROGRESS NOTES
ASSESSMENT:  1. Type 2 diabetes mellitus without complication, without long-term current use of insulin  Blood is well managed and there is no organ damage. Patient will continue to monitor his blood sugar and we will get A1C today.   - Glycosylated Hemoglobin A1c: A1C is 6.5 today, encourage patient to increase his exercise and monitor carb intake. Plan to recheck A1C in 3 months.     2. Need for vaccination  Complete vaccination   - Tdap vaccine greater than or equal to 8yo IM    PLAN:  Patient Instructions   A1c goal of 6.0 or below. Diet, exercise, and weight loss will all help. If you continue to trend in this direction, we may be able to take you off of Trulicity.       Orders Placed This Encounter   Procedures     Tdap vaccine greater than or equal to 8yo IM     Glycosylated Hemoglobin A1c     There are no discontinued medications.    No Follow-up on file.    CHIEF COMPLAINT:  Chief Complaint   Patient presents with     Immunizations     Tdap      Diabetes     6 month follow up     Establish Care       HISTORY OF PRESENT ILLNESS:  Eddie is a 36 y.o. male presenting to the clinic today to establish care. He is accompanied by his son today.    Diabetes: He had an adverse skin reaction to Bydureon in the past. He is managing well on metformin and Trulicity. His A1c from 8/22/16 was 6.1. This was improved from 7.1 on 3/18/16. He notes that if his glucose is 100 or below, he starts to feel dizzy. His glucose usually ranges from 100-140. He denies any numbness or tingling of the feet. He sees an ophthalmologist regularly, and his last eye exam was 2 months ago. He maintains a healthy diet and exercises regularly.     Immunization status: due today.      REVIEW OF SYSTEMS:   Positive for dizziness, as noted above. All other systems are negative.    PFSH:    History   Smoking Status     Former Smoker     Years: 10.00     Quit date: 2005   Smokeless Tobacco     Current User       Family History   Problem  Relation Age of Onset     No Medical Problems Mother      Lung cancer Father      No Medical Problems Maternal Grandmother      No Medical Problems Maternal Grandfather      No Medical Problems Paternal Grandmother      No Medical Problems Paternal Grandfather        Social History     Social History     Marital status:      Spouse name: N/A     Number of children: 3     Years of education: N/A     Occupational History     Not on file.     Social History Main Topics     Smoking status: Former Smoker     Years: 10.00     Quit date: 2005     Smokeless tobacco: Current User     Alcohol use Yes      Comment: ocassionally      Drug use: No     Sexual activity: Yes     Partners: Female     Birth control/ protection: Surgical     Other Topics Concern     Not on file     Social History Narrative       Past Surgical History:   Procedure Laterality Date     VASECTOMY         Allergies   Allergen Reactions     Bydureon [Exenatide Microspheres] Rash     Reaction to microspheres in drug formulation       Active Ambulatory Problems     Diagnosis Date Noted     Type 2 diabetes mellitus without complication      Edema      Resolved Ambulatory Problems     Diagnosis Date Noted     No Resolved Ambulatory Problems     Past Medical History:   Diagnosis Date     Diabetes mellitus        VITALS:  Vitals:    03/01/17 0929   BP: 128/80   Patient Site: Right Arm   Patient Position: Sitting   Cuff Size: Adult Regular   Pulse: 84   Weight: (!) 237 lb 9.6 oz (107.8 kg)     Wt Readings from Last 3 Encounters:   03/01/17 (!) 237 lb 9.6 oz (107.8 kg)   08/22/16 (!) 233 lb 6.4 oz (105.9 kg)   04/07/16 (!) 237 lb (107.5 kg)     Body mass index is 33.14 kg/(m^2).    PHYSICAL EXAM:  Visit Vitals     /80 (Patient Site: Right Arm, Patient Position: Sitting, Cuff Size: Adult Regular)     Pulse 84     Wt (!) 237 lb 9.6 oz (107.8 kg)     BMI 33.14 kg/m2     Physical Examination: General appearance - alert, well appearing, and in no  distress  Neurological: alert, oriented, normal speech, no focal findings or movement disorder noted  Extremities: No edema, no clubbing or cyanosis  Psychiatric: Normal affect. Does not appear anxious or depressed.    Diabetic Foot Exam: Pulses in the feet intact, capillary filling normal, no foot ulcers. Good sensation to monofilament exam.       ADDITIONAL HISTORY SUMMARIZED (2): Reviewed MTM note from 8/22/16 regarding Trulicity and adverse reaction to Bydureon.   DECISION TO OBTAIN EXTRA INFORMATION (1): None.   RADIOLOGY TESTS (1): None.  LABS (1):  Reviewed previous A1c values from 2016. Ordered A1c today.   MEDICINE TESTS (1): None.  INDEPENDENT REVIEW (2 each): None.     The visit lasted a total of 11 minutes face to face with the patient. Over 50% of the time was spent counseling and educating the patient about diabetes management.    Milly STEVENS am scribing for and in the presence of, Promise Amajiri, FNP.    Lucia STEVENS FNP personally performed the services described in this documentation, as scribed by Milly Baires in my presence, and it is both accurate and complete.    MEDICATIONS:  Current Outpatient Prescriptions   Medication Sig Dispense Refill     blood glucose test (ACCU-CHEK NATHALIA PLUS TEST STRP) strips Test once daily 100 strip 3     cholecalciferol, vitamin D3, 1,000 unit tablet Take 1,000 Units by mouth daily.       lancets (TRUEPLUS LANCETS) 33 gauge Misc Use 1 each As Directed 2 times a day at 6:00 am and 4:00 pm. 100 each 3     metFORMIN (GLUCOPHAGE-XR) 500 MG 24 hr tablet Take 2 tablets by mouth twice daily 360 tablet 1     MULTIVITAMIN ORAL Take by mouth daily.       TRULICITY 0.75 mg/0.5 mL PnIj INJECT 0.75MG UNDER THE SKIN EVERY 7 DAYS 2 mL 0     No current facility-administered medications for this visit.        Total data points: 3

## 2021-06-09 NOTE — PROGRESS NOTES
MTM Encounter    ASSESSMENT AND PLAN    1. Type 2 diabetes mellitus   A1c is meeting goal of <7%, but has trended higher in recent months. Ideally would like to see A1c closer to 6% because he is so young. He knows this was largely due to not eating as well over the past few months and he's committed to making positive dietary changes. If his A1c does continue to rise, we do have room to increase Trulicity to 1.5 mg SQ weekly. He will be getting his A1c rechecked in 3 months as directed by PCP.         SUBJECTIVE AND OBJECTIVE  Eddie Kelley is a 36 y.o. male here for a medication therapy management (MTM) appointment.       1. Type 2 diabetes mellitus   Rodriguez has been meeting with me through a [facility] program in which he receives his diabetes medications free of charge if he meets with a MTM pharmacist. He is taking metformin extended release 2000 mg daily as well as Trulicity 0.75 mg SQ weekly. We initially started Bydureon, but he developed a skin reaction to the microspheres in the formulation. He typically checks his BG twice a day - morning and after dinner. Morning fasting readings are typically between 120-130, one high of 140. His 30 day average BG is 115. He feels slightly symptomatic when his BG drops below 100, but understands this may be because his BG are trending higher lately. He knows he hasn't been eating as well as he should which could explain the increase in his A1c.     Lab Results   Component Value Date    HGBA1C 6.5 (H) 03/01/2017                 Eddie's medication list was reviewed with them, discussing reason for use, directions for use, and potential side effects of each medication as needed. Indication, safety, efficacy, and convenience was assessed for all medications addressed above.  No environmental factors were noted currently affecting patient.  This care plan was communicated via EMR with his primary care provider, OLIVE Zurita, who is the authorizing  prescriber for this visit.  Direct supervision was available by either the patient's PCP or other available provider.    Time Spent: 20 minutes  Time and complexity billing metrics are included in the doc-flowsheet linked to this visit.       Giselle Mayes, PharmD, BCACP    Current Outpatient Prescriptions   Medication Sig Dispense Refill     blood glucose test (ACCU-CHEK NATHALIA PLUS TEST STRP) strips Test once daily 100 strip 3     cholecalciferol, vitamin D3, 1,000 unit tablet Take 1,000 Units by mouth daily.       lancets (TRUEPLUS LANCETS) 33 gauge Misc Use 1 each As Directed 2 times a day at 6:00 am and 4:00 pm. 100 each 3     metFORMIN (GLUCOPHAGE-XR) 500 MG 24 hr tablet Take 2 tablets by mouth twice daily 360 tablet 1     MULTIVITAMIN ORAL Take by mouth daily.       TRULICITY 0.75 mg/0.5 mL PnIj INJECT 0.75MG UNDER THE SKIN EVERY 7 DAYS 2 mL 0     No current facility-administered medications for this visit.

## 2021-06-11 NOTE — PROGRESS NOTES
Assessment:   1. Sebaceous cyst  Discussed diagnosis and treatment plan with patient. Patient will monitor cyst and follow up with changes.     2. Need for vaccination  Discussed need for vaccination  - Influenza,Seasonal,Quad,INJ =/>6months     Plan:   1. I have discussed treatment options consisting of observation or excision under local anesthesia.    2. Patient is not inclined to proceed with excision.  3. Verbal patient instruction given.  4. Follow up as needed for acute illness     Subjective:   Eddie Kelley is a 40 y.o. male who presents for evaluation of a subcutaneous nodule located over the right shoulder.  This has been present for a few months.  There has been increase in size.  Patient does not have a history of epidermal inclusion cysts.    The following portions of the patient's history were reviewed and updated as appropriate: allergies, current medications, past family history, past medical history, past social history, past surgical history and problem list.    Review of Systems  A 12 point comprehensive review of systems was negative except as noted.      Objective:      /85   Pulse 68   Wt (!) 246 lb (111.6 kg)   SpO2 97%   BMI 34.31 kg/m    Physical Exam    Skin: 2 centimeter subcutaneous nodule over the right shoulder. The lesion is fully mobile. There is mild erythema.  There is no tenderness.

## 2021-06-12 NOTE — PROGRESS NOTES
MTM Encounter    ASSESSMENT AND PLAN    1. Type 2 diabetes mellitus   A1c is meeting goal of <7%. Reported blood sugars are meeting goal for both fasting and post-prandial BG. Reviewed A1c goal and BG goals with patient. Current medication regimen is working well for him. He is on a short course of steroids for Bell's Palsy, which will increase his BG temporarily. Advised him to let us know if BG continue to be elevated when he stops the steroids. He verbalized understanding.         SUBJECTIVE AND OBJECTIVE  Eddie Kelley is a 37 y.o. male here for a medication therapy management (MTM) appointment.       1. Type 2 diabetes mellitus   Rodriguez has been meeting with me through a [facility] program in which he receives his diabetes medications free of charge if he meets with a MTM pharmacist. He is taking metformin extended release 2000 mg daily as well as Trulicity 0.75 mg SQ weekly. We initially started Bydureon, but he developed a skin reaction to the microspheres in the formulation. He typically checks his BG twice a day - morning and after dinner. 30 day average BG is 135. BG are usually  throughout the day. Lowest BG was 89, so no hypoglycemia. He has been on a course of steroids for Bell's Palsy and understands his BG will be higher because of this.     Lab Results   Component Value Date    HGBA1C 6.5 (H) 09/06/2017        BP Readings from Last 3 Encounters:   09/06/17 122/68   08/31/17 122/63   03/01/17 128/80     Lab Results   Component Value Date    CHOL 163 03/18/2016    CHOL 186 03/24/2014    CHOL 179 07/18/2013     Lab Results   Component Value Date    HDL 42 03/18/2016    HDL 52 03/24/2014    HDL 52 07/18/2013     Lab Results   Component Value Date    LDLCALC 108 03/18/2016    LDLCALC 122 03/24/2014    LDLCALC 116 07/18/2013     Lab Results   Component Value Date    TRIG 67 03/18/2016    TRIG 57 03/24/2014    TRIG 58 07/18/2013     No components found for: CORONA Davis  medication list was reviewed with them, discussing reason for use, directions for use, and potential side effects of each medication as needed. Indication, safety, efficacy, and convenience was assessed for all medications addressed above.  No environmental factors were noted currently affecting patient.  This care plan was communicated via EMR with his primary care provider, OLIVE Zurita, who is the authorizing prescriber for this visit.  Direct supervision was available by either the patient's PCP or other available provider.    Time Spent: 20 minutes  Time and complexity billing metrics are included in the doc-flowsheet linked to this visit.       Giselle Mayes, PharmD, BCACP    Current Outpatient Prescriptions   Medication Sig Dispense Refill     blood glucose test (ACCU-CHEK NATHALIA PLUS TEST STRP) strips Test once daily 100 strip 3     cholecalciferol, vitamin D3, 1,000 unit tablet Take 1,000 Units by mouth daily.       lancets (TRUEPLUS LANCETS) 33 gauge Misc Use 1 each As Directed 2 times a day at 6:00 am and 4:00 pm. 100 each 3     metFORMIN (GLUCOPHAGE-XR) 500 MG 24 hr tablet Take 2 tablets by mouth twice daily 360 tablet 1     methylPREDNISolone (MEDROL, HOANG,) 4 mg tablet follow package directions 1 tablet 0     MULTIVITAMIN ORAL Take by mouth daily.       predniSONE (DELTASONE) 20 MG tablet Take 2 tablets (40 mg total) by mouth daily. 14 tablet 0     TRULICITY 0.75 mg/0.5 mL PnIj INJECT 0.75MG( 0.5ML) SUBCUTANEOUSLY UNDER THE SKIN EVERY 7 DAYS 6 mL 0     valACYclovir (VALTREX) 1000 MG tablet Take 1 tablet (1,000 mg total) by mouth 3 (three) times a day for 7 days. 21 tablet 0     No current facility-administered medications for this visit.

## 2021-06-12 NOTE — PROGRESS NOTES
"  Office Visit - Follow Up   Eddie Kelley   37 y.o. male    Date of Visit: 9/13/2017    Chief Complaint   Patient presents with     Follow-up        Assessment and Plan   1. Bell's palsy  Discussed the prognosis of Bell's palsy and chances of recurrence.  Encourage patient to continue to do home exercise around his face and to follow-up with neurologist as scheduled in October.       History of Present Illness   This 37 y.o. old male patient with history of type 2 diabetes mellitus without complication returns to the clinic today for follow-up of his Bell's palsy.  Patient stated that he took his last corticosteroid today and he took his last antiviral yesterday.  He stated that he has noticed that his face is looking better but still feels some numbness around the right side of his face.  Patient denied any drooping or any issues with his eyes.    Review of Systems: A 12 point comprehensive review of systems was negative except as noted.     Medications, Allergies and Problem List   Reviewed and updated     Physical Exam   General Appearance: Well-groomed    /68  Pulse 80  Ht 5' 11\" (1.803 m)  Wt (!) 235 lb (106.6 kg)  SpO2 97%  BMI 32.78 kg/m2  Physical Examination: General appearance - alert, well appearing, and in no distress  Eyes: pupils equal and reactive, extraocular eye movements intact  Mouth: mucous membranes moist, pharynx normal without lesions, mild numbness right side of the face compared to left side, no deviation of the tongue,   Neck: supple, no significant adenopathy  Neurological: alert, oriented, normal speech, no focal findings or movement disorder noted  Psychiatric: Normal affect. Does not appear anxious or depressed.       Additional Information   Current Outpatient Prescriptions   Medication Sig Dispense Refill     blood glucose test (ACCU-CHEK NATHALIA PLUS TEST STRP) strips Test once daily 100 strip 3     cholecalciferol, vitamin D3, 1,000 unit tablet Take 1,000 Units by " mouth daily.       lancets (TRUEPLUS LANCETS) 33 gauge Misc Use 1 each As Directed 2 times a day at 6:00 am and 4:00 pm. 100 each 3     metFORMIN (GLUCOPHAGE-XR) 500 MG 24 hr tablet Take 2 tablets by mouth twice daily 360 tablet 1     methylPREDNISolone (MEDROL, HOANG,) 4 mg tablet follow package directions 1 tablet 0     MULTIVITAMIN ORAL Take by mouth daily.       predniSONE (DELTASONE) 20 MG tablet Take 2 tablets (40 mg total) by mouth daily. 14 tablet 0     TRULICITY 0.75 mg/0.5 mL PnIj INJECT 0.75MG( 0.5ML) SUBCUTANEOUSLY UNDER THE SKIN EVERY 7 DAYS 6 mL 0     valACYclovir (VALTREX) 1000 MG tablet Take 1 tablet (1,000 mg total) by mouth 3 (three) times a day for 7 days. 21 tablet 0     No current facility-administered medications for this visit.      Allergies   Allergen Reactions     Bydureon [Exenatide Microspheres] Rash     Reaction to microspheres in drug formulation     Social History   Substance Use Topics     Smoking status: Former Smoker     Years: 10.00     Quit date: 2005     Smokeless tobacco: Current User     Alcohol use Yes      Comment: ocassionally      Time: total time spent with the patient was 15 minutes of which >50% was spent in counseling and coordination of care     Lucia Werner, CNP

## 2021-06-12 NOTE — PROGRESS NOTES
Office Visit - Follow Up   Eddie Kelley   37 y.o. male    Date of Visit: 9/6/2017    Chief Complaint   Patient presents with     Follow-up     diabetes follow up   NO concerns        Assessment and Plan   1. Bell's palsy  Information regarding Bell's palsy was given to patient.  Prognosis was discussed with patient and treatment options were also discussed.  Since patient has not responded to initial Medrol pack we agreed to start him on a higher dose of corticosteroid in addition to antiviral therapy   - Influenza, Seasonal,Quad Inj, 36+ MOS  - Herpes Simplex Virus (HSV) Antibody Screen, IgM, By EIA, Serum  - valACYclovir (VALTREX) 1000 MG tablet; Take 1 tablet (1,000 mg total) by mouth 3 (three) times a day for 7 days.  Dispense: 21 tablet; Refill: 0  - predniSONE (DELTASONE) 20 MG tablet; Take 2 tablets (40 mg total) by mouth daily.  Dispense: 14 tablet; Refill: 0  Follow-up in 7 days    2. Type 2 diabetes mellitus without complication, without long-term current use of insulin  Informed patient that he might have noted that his blood sugars are going up due to his recent use of corticosteroid.  We will check A1c today and follow-up in 3 months  - blood glucose test (ACCU-CHEK NATHALIA PLUS TEST STRP) strips; Test once daily  Dispense: 100 strip; Refill: 3  - Glycosylated Hemoglobin A1c         History of Present Illness   This 37 y.o. old male patient with history of type 2 diabetes mellitus without complication presents to the clinic today for follow-up of his diabetes.  A week ago patient was seen in the ED for evaluation of right facial numbness and facial droop which has progressively worsened.  While in the ED stroke was ruled out, Lyme disease was also ruled out.  Neurologist was consulted and Bell's palsy was diagnosed patient was sent home with Medrol pack.  Patient stated that he is taking the Medrol pack and he took the last tablet this morning but he has not noticed any difference rather he has  "noticed some tingling sensation on his right eye.    Review of Systems: A 12 point comprehensive review of systems was negative except as noted.     Medications, Allergies and Problem List   Reviewed and updated     Physical Exam   General Appearance: Well groomed    /68  Pulse 70  Ht 5' 11\" (1.803 m)  Wt (!) 244 lb 3.2 oz (110.8 kg)  SpO2 98%  BMI 34.06 kg/m2    Right sided facial droop, mild numbness right side of the face compared to left side, no deviation of the tongue, weakness of wrinkling right forehead compared to left, mild paraesthesia to right jaw line compared to left.  Incomplete closure of right eye.     Additional Information   Current Outpatient Prescriptions   Medication Sig Dispense Refill     blood glucose test (ACCU-CHEK NATHALIA PLUS TEST STRP) strips Test once daily 100 strip 3     lancets (TRUEPLUS LANCETS) 33 gauge Misc Use 1 each As Directed 2 times a day at 6:00 am and 4:00 pm. 100 each 3     metFORMIN (GLUCOPHAGE-XR) 500 MG 24 hr tablet Take 2 tablets by mouth twice daily 360 tablet 1     MULTIVITAMIN ORAL Take by mouth daily.       TRULICITY 0.75 mg/0.5 mL PnIj INJECT 0.75MG( 0.5ML) SUBCUTANEOUSLY UNDER THE SKIN EVERY 7 DAYS 6 mL 0     cholecalciferol, vitamin D3, 1,000 unit tablet Take 1,000 Units by mouth daily.       erythromycin ophthalmic ointment Place 1/2 inch ribbon in lower eyelid at bedtime for 10 days 3.5 g 0     methylPREDNISolone (MEDROL, HOANG,) 4 mg tablet follow package directions 1 tablet 0     predniSONE (DELTASONE) 20 MG tablet Take 2 tablets (40 mg total) by mouth daily. 14 tablet 0     valACYclovir (VALTREX) 1000 MG tablet Take 1 tablet (1,000 mg total) by mouth 3 (three) times a day for 7 days. 21 tablet 0     No current facility-administered medications for this visit.      Allergies   Allergen Reactions     Bydureon [Exenatide Microspheres] Rash     Reaction to microspheres in drug formulation     Social History   Substance Use Topics     Smoking status: " Former Smoker     Years: 10.00     Quit date: 2005     Smokeless tobacco: Current User     Alcohol use Yes      Comment: ocassionally      Reviewed ED visits progress note and confirmed diagnosis of Bell's palsy and treatment with Medrol pack.  Reviewed lab result and confirmed negative Lyme disease    Time: total time spent with the patient was 25 minutes of which >50% was spent in counseling and coordination of care     Lucia Werner, CNP

## 2021-06-13 NOTE — PROGRESS NOTES
Assessment/Plan:   1. Type 2 diabetes mellitus without complication, without long-term current use of insulin (H)  Diabetes mellitus Type II, under fair control. Patient will increase exercise and reduce carbohydrate diet while he work on reducing his weight.   Discussed general issues about diabetes pathophysiology and management.  Suggested low cholesterol diet.  Encouraged aerobic exercise.  Discussed foot care.  Reminded to get yearly retinal exam.  Discussed ways to avoid symptomatic hypoglycemia.  Discussed sick day management.  Continued metformin; see  medication orders.  Labs: hemoglobin A1C.  Follow up in 3 months or as needed.  Trulicity   - Hemoglobin A1c  - Diabetes foot exam    Subjective:   Eddie Kelley is a 40 y.o. male who presents for follow up of diabetes.. Current symptoms include: none. Patient denies foot ulcerations, hyperglycemia, hypoglycemia , increased appetite, nausea, paresthesia of the feet, polydipsia, polyuria, visual disturbances, vomiting and weight loss. Evaluation to date has been: fasting blood sugar, fasting lipid panel, hemoglobin A1C and microalbuminuria. Home sugars: BGs consistently in an acceptable range. Current treatments: Continued metformin which has been effective and Trulicity, effective. Last dilated eye exam 2019.    The following portions of the patient's history were reviewed and updated as appropriate: allergies, current medications, past family history, past medical history, past social history, past surgical history and problem list.    Review of Systems  A 12 point comprehensive review of systems was negative except as noted.      Objective:   /82   Pulse 85   Wt (!) 251 lb (113.9 kg)   BMI 35.01 kg/m    General appearance: alert, appears stated age and cooperative  Head: Normocephalic, without obvious abnormality, atraumatic  Neurologic: Grossly normal      [unfilled]    Patient was evaluated for proper footwear and  sizing.    Laboratory:  No components found for: A1C

## 2021-06-14 ENCOUNTER — COMMUNICATION - HEALTHEAST (OUTPATIENT)
Dept: FAMILY MEDICINE | Facility: CLINIC | Age: 41
End: 2021-06-14

## 2021-06-14 DIAGNOSIS — E11.9 TYPE 2 DIABETES MELLITUS (H): ICD-10-CM

## 2021-06-14 DIAGNOSIS — E66.01 MORBID OBESITY (H): ICD-10-CM

## 2021-06-15 NOTE — PROGRESS NOTES
MTM Encounter    ASSESSMENT AND PLAN    Type 2 diabetes mellitus   Due for A1c today, but historically has been well controlled. Reported blood sugars are meeting goal for both fasting and post-prandial BG. No hypoglycemia. He is interested in reducing the amount of medications he is taking. I think it would be reasonable to decrease metformin from 2,000 mg/day to 1,000 mg/day and continue to monitor BG. I'd like him to stay on Trulicity for the additional benefits of pancreatic beta cell preservation, weight loss, and possible CV benefit.   Plan   1. Check A1c.   2. Decrease metformin ER to 1,000 mg/day.     Supplements  Currently on vitamin D supplementation. Level in 2016 was below normal range. Recommend re-checking vitamin D level to assess current dose.   Plan   1. Check vitamin D.         SUBJECTIVE AND OBJECTIVE  Eddie Kelley is a 37 y.o. male here for a medication therapy management (MTM) appointment.       Type 2 diabetes mellitus   Rodriguez has been meeting with me through a [facility] program in which he receives his diabetes medications free of charge if he meets with a MTM pharmacist. He is taking metformin extended release 2000 mg daily as well as Trulicity 0.75 mg SQ weekly. We initially started Bydureon, but he developed a skin reaction to the microspheres in the formulation. He typically checks his BG twice a day - morning and after dinner. His 30 day average is 109. His 14 day is 114. BG of  is his typical range, either before eating or after. Lowest was 72, which only happened once in the past several months. He is interested in taking less medication if possible.     Lab Results   Component Value Date    HGBA1C 6.5 (H) 09/06/2017        BP Readings from Last 3 Encounters:   01/27/18 110/60   01/11/18 136/78   09/13/17 126/68     Supplements  Rodriguez is taking vitamin D 1,000 IU daily.     Vitamin D, Total (25-Hydroxy)   Date Value Ref Range Status   03/18/2016 27.0 (L) 30.0 - 80.0  ng/mL Final         Eddie's medication list was reviewed with them, discussing reason for use, directions for use, and potential side effects of each medication as needed. Indication, safety, efficacy, and convenience was assessed for all medications addressed above.  No environmental factors were noted currently affecting patient.  This care plan was communicated via EMR with his primary care provider, OLIVE Zurita, who is the authorizing prescriber for this visit.  Direct supervision was available by either the patient's PCP or other available provider.    Time Spent: 20 minutes  Time and complexity billing metrics are included in the doc-flowsheet linked to this visit.       Giselle Mayes, PharmD, BCACP    Current Outpatient Prescriptions   Medication Sig Dispense Refill     blood glucose test (ACCU-CHEK NATHALIA PLUS TEST STRP) strips Test once daily 100 strip 3     cholecalciferol, vitamin D3, 1,000 unit tablet Take 1,000 Units by mouth daily.       fluticasone (FLONASE) 50 mcg/actuation nasal spray 1 spray into each nostril daily. 16 g 2     lancets (TRUEPLUS LANCETS) 33 gauge Misc Use 1 each As Directed 2 times a day at 6:00 am and 4:00 pm. 100 each 3     loratadine (CLARITIN) 10 mg tablet Take 1 tablet (10 mg total) by mouth daily. 30 tablet 2     metFORMIN (GLUCOPHAGE-XR) 500 MG 24 hr tablet TAKE 2 TABLETS BY MOUTH TWICE DAILY 360 tablet 0     MULTIVITAMIN ORAL Take by mouth daily.       TRULICITY 0.75 mg/0.5 mL PnIj INJECT 0.75 MG UNDER THE SKIN EVERY 7 DAYS 6 mL 0     No current facility-administered medications for this visit.

## 2021-06-15 NOTE — TELEPHONE ENCOUNTER
Refill Request  Medication name:   Requested Prescriptions     Pending Prescriptions Disp Refills     metFORMIN (GLUCOPHAGE-XR) 500 MG 24 hr tablet 360 tablet 3     Sig: Take 2 tablets (1,000 mg total) by mouth 2 (two) times a day.     Who prescribed the medication: Promise  Last refill on medication: 1/15/2020  Requested Pharmacy: [facility]  Last appointment with PCP: 12/22/2020  Next appointment: Not due

## 2021-06-15 NOTE — PROGRESS NOTES
Assessment:       Body aches       Plan:       OTC analgesics discussed  Fluids, rest  Discussed signs of worsening infection and when to follow-up with PCP if no symptom improvement.      Patient Instructions   You were seen today for flu-like symptoms. Although the rapid influenza test was negative, this is still most likely due to a virus. You are contagious until your fever is gone for 24 hours. Maintain good hand hygiene, cover your cough, and limit contact to prevent spreading the illness. Symptoms typically last 1-2 weeks.    Symptom management:  - Drink plenty of fluids and allow for plenty of rest  - Use tylenol or ibuprofen every 6-8 hours as needed for fever/discomfort    Reasons to be seen immediately for re-evaluation:  - Have trouble breathing or are short of breath  - Feel pain or pressure in your chest or belly  - Get suddenly dizzy  - Feel confused  - Have severe vomiting    If no symptom improvement in 1 week, follow-up with your primary care provider.        Subjective:       Eddie Kelley is a 37 y.o. male who presents for evaluation of influenza like symptoms. Patient was seen two weeks ago, diagnosed with influenza A, and treated with antivirals. Symptoms had completely resolved at that time. Symptoms now include body aches, chills, and nausea and have been present for 2 days. He has tried to alleviate the symptoms with ibuprofen with moderate relief. High risk factors for influenza complications: none. Denies cough, rhinorrhea, vomiting, diarrhea, and ear pain.    The following portions of the patient's history were reviewed and updated as appropriate: allergies, current medications and problem list.    Review of Systems  Pertinent items are noted in HPI.     Allergies  Allergies   Allergen Reactions     Bydureon [Exenatide Microspheres] Rash     Reaction to microspheres in drug formulation           Objective:        /60  Pulse 77  Temp 98.2  F (36.8  C) (Oral)   Wt (!) 240 lb  (108.9 kg)  SpO2 98%  BMI 33.47 kg/m2  General appearance: alert, appears stated age, cooperative, no distress and non-toxic  Head: Normocephalic, without obvious abnormality, atraumatic, sinuses nontender to percussion  Ears: normal TM's and external ear canals both ears  Nose: no discharge  Throat: no tonsil swelling, erythema, or exudate; MMM, lips and tongue normal  Neck: no adenopathy and supple, symmetrical, trachea midline  Lungs: clear to auscultation bilaterally and no rhonchi, rales, or wheezing  Heart: regular rate and rhythm, S1, S2 normal, no murmur, click, rub or gallop  Abdomen: soft, non-tender; bowel sounds normal; no masses,  no organomegaly    Lab Results    Recent Results (from the past 24 hour(s))   Influenza A/B Rapid Test   Result Value Ref Range    Influenza  A, Rapid Antigen No Influenza A antigen detected No Influenza A antigen detected    Influenza B, Rapid Antigen No Influenza B antigen detected No Influenza B antigen detected     I personally reviewed these results and discussed findings with the patient.

## 2021-06-15 NOTE — PROGRESS NOTES
"Assessment:   1. Influenza A  Positive Influenza A  - Influenza A/B Rapid Test  - fluticasone (FLONASE) 50 mcg/actuation nasal spray; 1 spray into each nostril daily.  Dispense: 16 g; Refill: 2  - loratadine (CLARITIN) 10 mg tablet; Take 1 tablet (10 mg total) by mouth daily.  Dispense: 30 tablet; Refill: 2  - oseltamivir (TAMIFLU) 75 MG capsule; Take 1 capsule (75 mg total) by mouth 2 (two) times a day for 5 days.  Dispense: 10 capsule; Refill: 0    Plan:   Supportive care with appropriate antipyretics and fluids.  Obtain labs per orders.  Antivirals per orders.  Follow up in 5 days or as needed.     Subjective:   Eddie Kelley is a 37 y.o. male who presents for evaluation of influenza like symptoms. Symptoms include low grade fevers, chills, headache, hoarseness, myalgias, clear nasal discharge, night sweats, productive cough, sinus and nasal congestion, bilateral sinus pain and fever and have been present for 2 days. He has tried to alleviate the symptoms with cold and flu OTC with no relief. High risk factors for influenza complications: none.    The following portions of the patient's history were reviewed and updated as appropriate: allergies, current medications, past family history, past medical history, past social history, past surgical history and problem list.    Review of Systems  A 12 point comprehensive review of systems was negative except as noted.       Objective:   /78  Pulse 72  Temp 98.4  F (36.9  C) (Oral)   Ht 5' 11\" (1.803 m)  Wt (!) 244 lb 3.2 oz (110.8 kg)  SpO2 96%  BMI 34.06 kg/m2  General appearance: alert, appears stated age and cooperative  Head: Normocephalic, without obvious abnormality, atraumatic  Eyes: conjunctivae/corneas clear. PERRL, EOM's intact. Fundi benign.  Ears: normal TM's and external ear canals both ears  Nose: clear discharge, moderate congestion, turbinates swollen  Throat: lips, mucosa, and tongue normal; teeth and gums normal  Neck: no " adenopathy, no carotid bruit, no JVD, supple, symmetrical, trachea midline and thyroid not enlarged, symmetric, no tenderness/mass/nodules  Lungs: clear to auscultation bilaterally  Heart: regular rate and rhythm, S1, S2 normal, no murmur, click, rub or gallop  Pulses: 2+ and symmetric  Skin: Skin color, texture, turgor normal. No rashes or lesions  Lymph nodes: Cervical, supraclavicular, and axillary nodes normal.  Neurologic: Grossly normal

## 2021-06-15 NOTE — PROGRESS NOTES
"  Assessment:   1. Type 2 diabetes mellitus without complication, without long-term current use of insulin  Well managed  - Glycosylated Hemoglobin A1C  - Vitamin D, Total (25-Hydroxy)  - Comprehensive Metabolic Panel  -Microalbumin, Random Urine     Plan:   Discussed general issues about diabetes pathophysiology and management.  Counseling at today's visit: discussed the need for weight loss.  Suggested low cholesterol diet.  Encouraged aerobic exercise.  Discussed foot care.  Reminded to get yearly retinal exam.     Subjective:   Eddie Kelley is an 37 y.o. male who presents for follow up of diabetes. Current symptoms include: none. Patient denies foot ulcerations, hyperglycemia, hypoglycemia , increased appetite, nausea, polydipsia, polyuria, visual disturbances, vomiting and weight loss. Evaluation to date has included: hemoglobin A1C and microalbuminuria. Home sugars: BGs consistently in an acceptable range. Current treatments: low cholesterol diet which has been effective, Continued metformin which has been effective and Continued trulycity he which has been effective. Last dilated eye exam was a year ago.    The following portions of the patient's history were reviewed and updated as appropriate: allergies, current medications, past family history, past medical history, past social history, past surgical history and problem list.    Review of Systems  A 12 point comprehensive review of systems was negative except as noted.      Objective:   /70  Pulse 80  Ht 5' 11\" (1.803 m)  Wt (!) 245 lb 11.2 oz (111.4 kg)  SpO2 98%  BMI 34.27 kg/m2  General appearance: alert, appears stated age and cooperative  Head: Normocephalic, without obvious abnormality, atraumatic  Eyes: conjunctivae/corneas clear. PERRL, EOM's intact. Fundi benign.  Extremities: extremities normal, atraumatic, no cyanosis or edema  Pulses: 2+ and symmetric  Skin: Skin color, texture, turgor normal. No rashes or " lesions  Neurologic: Grossly normal    Laboratory:  No components found for: A1C

## 2021-06-16 NOTE — TELEPHONE ENCOUNTER
Refill Request  Medication name:   Requested Prescriptions     Pending Prescriptions Disp Refills     flash glucose scanning reader (FREESTYLE VARUN 14 DAY READER) Misc 1 each 3     Who prescribed the medication: Lucia Werner, FNP - PCP  Last refill on medication: 05/20/2019  Requested Pharmacy: [facility]  Last appointment with PCP: 12/22/2020  Next appointment: due for f/u.

## 2021-06-16 NOTE — TELEPHONE ENCOUNTER
Refill Request  Medication name:   Requested Prescriptions     Pending Prescriptions Disp Refills     flash glucose sensor Kit 2 kit 11     Sig: USE 1 APPLICATION DIRECTED     Who prescribed the medication: Lucia Werner FNP  Last refill on medication: 02/06/2020  Requested Pharmacy[facility]  Last appointment with PCP: 12/22/2020  Next appointment: Not due

## 2021-06-17 NOTE — PROGRESS NOTES
"  Assessment:     1. Ear pressure, left  Likely secondary to viral upper respiratory infection.  Urge patient to start using Flonase and ibuprofen.  If symptom persists return to the clinic for reevaluation.  We also discussed possible referral to ENT.       Plan:   Discussed diagnosis and treatment of URI.  Discussed the importance of avoiding unnecessary antibiotic therapy.  Suggested symptomatic OTC remedies.  Nasal saline spray for congestion.  Nasal steroids per orders.  Follow up as needed.  Follow up with PCP in 5 days or as needed.     Subjective:   History was provided by the patient.  Eddie Kelley is a 37 y.o. male who presents for evaluation of ear infection. Symptoms include nasal blockage and Left ear pressure, left ear ringing and diminished hearing. Onset of symptoms was 4 days ago, gradually improving since that time. Associated symptoms include Sent upper respiratory cold symptoms.  He is drinking moderate amounts of fluids. Evaluation to date: none. Treatment to date: none  The following portions of the patient's history were reviewed and updated as appropriate: allergies, current medications, past family history, past medical history, past social history, past surgical history and problem list.    Review of Systems  A 12 point comprehensive review of systems was negative except as noted.      Objective:   /70  Pulse 83  Temp 98.7  F (37.1  C) (Oral)   Ht 5' 11\" (1.803 m)  Wt (!) 249 lb 4.8 oz (113.1 kg)  SpO2 99%  BMI 34.77 kg/m2  General appearance: alert, appears stated age and cooperative  Head: Normocephalic, without obvious abnormality, atraumatic  Ears: normal TM's and external ear canals both ears  Pulses: 2+ and symmetric  Skin: Skin color, texture, turgor normal. No rashes or lesions  Lymph nodes: Cervical, supraclavicular, and axillary nodes normal.  Neurologic: Grossly normal   "

## 2021-06-17 NOTE — TELEPHONE ENCOUNTER
Refill Approved    Rx renewed per Medication Renewal Policy. Medication was last renewed on 2/5/2021.    Coby Huynh, [facility] Connection Triage/Med Refill 5/5/2021     Requested Prescriptions   Pending Prescriptions Disp Refills     TRULICITY 0.75 mg/0.5 mL PnIj [Pharmacy Med Name: TRULICITY 0.75MG/0.5ML SDP 0.5ML] 6 mL 0     Sig: ADMINISTER 0.75 MG UNDER THE SKIN 1 TIME A WEEK       Insulin/GLP-1 Refill Protocol Passed - 5/5/2021 12:09 PM        Passed - Visit with PCP or prescribing provider visit in last 6 months     Last office visit with prescriber/PCP: Visit date not found OR same dept: 12/22/2020 Lucia Werner FNP OR same specialty: 12/22/2020 Lucia Werner FNP Last physical: Visit date not found Last MTM visit: Visit date not found     Next appt within 3 mo: Visit date not found  Next physical within 3 mo: Visit date not found  Prescriber OR PCP: Pretty Starkey NP  Last diagnosis associated with med order: 1. Type 2 diabetes mellitus (H)  - TRULICITY 0.75 mg/0.5 mL PnIj [Pharmacy Med Name: TRULICITY 0.75MG/0.5ML SDP 0.5ML]; ADMINISTER 0.75 MG UNDER THE SKIN 1 TIME A WEEK  Dispense: 6 mL; Refill: 0    2. Morbid obesity (H)  - TRULICITY 0.75 mg/0.5 mL PnIj [Pharmacy Med Name: TRULICITY 0.75MG/0.5ML SDP 0.5ML]; ADMINISTER 0.75 MG UNDER THE SKIN 1 TIME A WEEK  Dispense: 6 mL; Refill: 0    If protocol passes may refill for 6 months if within 3 months of last provider visit (or a total of 9 months).              Passed - A1C in last 6 months     Hemoglobin A1c   Date Value Ref Range Status   12/22/2020 7.1 (H) <=5.6 % Final               Passed - Microalbumin in last year     Microalbumin, Random Urine   Date Value Ref Range Status   07/15/2020 <0.50 0.00 - 1.99 mg/dL Final                  Passed - Blood pressure in last year     BP Readings from Last 1 Encounters:   12/22/20 130/82             Passed - Creatinine done in last year     Creatinine   Date Value Ref Range Status    07/15/2020 0.85 0.70 - 1.30 mg/dL Final

## 2021-06-19 NOTE — LETTER
Letter by Lucia Werner FNP at      Author: Lucia Werner FNP Service: -- Author Type: --    Filed:  Encounter Date: 8/5/2019 Status: (Other)         Eddie Kelley  [address] [place] [address]             August 5, 2019         Dear Mr. Jaimesnes,    Our records indicate that you are due for your annual diabetic eye exam.  If you have already completed this exam within this year please help us get a copy of the exam so we can update your records. They can be faxed to [phone]. If you have not completed this yet this year, please call your eye clinic and schedule an appointment.      Please call with questions or contact us using [employer].    Sincerely,        Electronically signed by OLIVE Zurita

## 2021-06-19 NOTE — LETTER
Letter by uLcia Werner FNP at      Author: Lucia Werner FNP Service: -- Author Type: --    Filed:  Encounter Date: 5/13/2019 Status: (Other)         Eddie Kelley  [address] [place] [address]             May 13, 2019         Dear Mr. Kelley,    I just want to send a friendly reminder that you are due for a diabetic check.  Please use my chart or call the clinic and schedule an appointment at your earliest convenience.    Please call with questions or contact us using [employer].    Sincerely,        Electronically signed by OLIVE Zurita

## 2021-06-19 NOTE — PROGRESS NOTES
"  Assessment:   1. Type 2 diabetes mellitus without complication, without long-term current use of insulin (H)  Under good control.     Plan:   Discussed general issues about diabetes pathophysiology and management.  Suggested low cholesterol diet.  Encouraged aerobic exercise.  Discussed foot care.  Reminded to get yearly retinal exam.  Discussed ways to avoid symptomatic hypoglycemia.  Follow up in 3 months or as needed.     Subjective:   Eddie Kelley is an 38 y.o. male who presents for follow up of diabetes. Current symptoms include: none. Patient denies foot ulcerations, hyperglycemia, hypoglycemia , increased appetite, nausea, paresthesia of the feet, polydipsia, polyuria, visual disturbances, vomiting and weight loss. Evaluation to date has included: fasting blood sugar and hemoglobin A1C. Home sugars: BGs consistently in an acceptable range. Current treatments: low cholesterol diet which has been effective, increased aerobic exercise which has been effective, Continued metformin which has been effective and Continued Actos which has been effective. Last dilated eye exam 2018.    The following portions of the patient's history were reviewed and updated as appropriate: allergies, current medications, past family history, past medical history, past social history, past surgical history and problem list.    Review of Systems  Pertinent items are noted in HPI.      Objective:      /68  Pulse 78  Ht 5' 11\" (1.803 m)  Wt (!) 248 lb 1.6 oz (112.5 kg)  SpO2 98%  BMI 34.6 kg/m2  General appearance: alert, appears stated age and cooperative  Head: Normocephalic, without obvious abnormality, atraumatic  Eyes: conjunctivae/corneas clear. PERRL, EOM's intact. Fundi benign.  Extremities: extremities normal, atraumatic, no cyanosis or edema  Pulses: 2+ and symmetric  Skin: Skin color, texture, turgor normal. No rashes or lesions  Lymph nodes: Cervical, supraclavicular, and axillary nodes " normal.  Neurologic: Grossly normal    Laboratory:  No components found for: A1C

## 2021-06-19 NOTE — PROGRESS NOTES
MTM Follow Up Encounter  Assessment & Plan                                                     1. Type 2 diabetes mellitus   Due for A1c today. Goal of <7%. Currently on metformin and GLP-1 agonist with reported BG generally within goal range. There is room to increase Trulicity dose if needed.   Blood pressure is well controlled and meeting goal of <140/90 mm Hg per JNC-8 hypertension guidelines without medication. No microalbuminuria. No statin indicated as patient is <40 years old and previous LDL of 115 mg/dl.   Plan   1. Check A1c, CMP, lipid panel.     2. Vitamin D deficiency  Most recent vitamin D level was within normal range.         Follow Up  See PCP next week for diabetes follow-up.   Return in about 6 months (around 2/8/2019).        Subjective & Objective                                                       Eddie Kelley is a 38 y.o. male coming in for a follow up visit for Medication Therapy Management. He was referred to me from OLIVE Zurita    Chief Complaint: Diabetes follow up  Medication Adherence/Access: Good; no issues identified.     1. Type 2 diabetes mellitus   Rodriguez is taking metformin ER 2000 mg daily and Trulicity 0.75 mg SQ weekly. At our last visit, we decreased metformin dose as patient was interested in minimizing medication use as diabetes has remained well controlled. He did this for a few days but noticed BG increasing, so went back to 2,000 mg daily. He does check his BG regularly. Average 133 with 74% in range in the past 30 days. No hypoglycemia. Lowest BG of 97.     Lab Results   Component Value Date    HGBA1C 6.5 (H) 02/05/2018       2. Vitamin D deficiency  Rodriguez takes vitamin D3 1,000 IU, but only during winter months as he is out in the sun quite a bit in the summer. He also takes a multivitmain every other day.     Vitamin D, Total (25-Hydroxy)   Date Value Ref Range Status   02/05/2018 30.6 30.0 - 80.0 ng/mL Final         PMH: reviewed in EPIC    Allergies/ADRs: reviewed in EPIC   Alcohol: reviewed in EPIC   Tobacco:   History   Smoking Status     Former Smoker     Years: 10.00     Quit date: 2005   Smokeless Tobacco     Current User     Today's Vitals:   Vitals:    08/08/18 0935   BP: 126/80   Pulse: 68   Weight: (!) 248 lb 1.6 oz (112.5 kg)     ----------------    Much or all of the text in this note was generated through the use of Dragon Dictate voice-to-text software. Errors in spelling or words which seem out of context are unintentional. Sound alike errors, in particular, may have escaped editing.    The patient was given a summary of these recommendations via [employer]    I spent 30 minutes with this patient today;   All changes were made via collaborative practice agreement with OLIVE Zurita. A copy of the visit note was provided to the patient's provider.     Giselle Mayes, PharmD, BCACP  Medication Management Pharmacist  [facility]          Current Outpatient Prescriptions   Medication Sig Dispense Refill     blood glucose test strips Use 1 each As Directed 2 (two) times a day as needed. Dispense brand per patient's insurance at pharmacy discretion. 180 strip 3     cholecalciferol, vitamin D3, 1,000 unit tablet Take 1,000 Units by mouth daily.       lancets (ONETOUCH DELICA LANCETS) 33 gauge Misc Use each as directed 2 times per day as needed. Dispense brand per patient's insurance at pharmacy discretion. 200 each 2     metFORMIN (GLUCOPHAGE-XR) 500 MG 24 hr tablet TAKE 2 TABLETS BY MOUTH TWICE DAILY 360 tablet 0     MULTIVITAMIN ORAL Take 1 tablet by mouth daily.       TRULICITY 0.75 mg/0.5 mL PnIj INJECT 0.75MG UNDER THE SKIN EVERY 7 DAYS 6 mL 0     No current facility-administered medications for this visit.

## 2021-06-20 NOTE — PROGRESS NOTES
Assessment:   1. Cluster headaches  2. Body aches  Differentials includes influnza, vitamin D deficiency, Hypertension, migraine, we'll obtain labs as noted to reassess  - Lyme Disease Antibody, Total  - Thyroid Stimulating Hormone (TSH)  - HM1 (CBC and Differential)  - HM1 (CBC with Diff)     Plan:   OTC medications: acetaminophen.  Labs as ordered.  Education regarding headaches was given.  Importance of adequate hydration discussed.  Follow up in 5 days.      Subjective:   Eddie Kelley is a 38 y.o. male who presents for evaluation of headache. Symptoms began about 4 days ago. Rapidity of onset was sudden. The patient gets headaches rarely. The headache is described as pounding and is bilateral in location.  The patient rates the pain a 8 on a scale from 1 to 10. Precipitating factors include none which have been determined. The headache was not preceded by an aura. Associated neurologic symptoms which are present include decreased physical activity, muscle weakness and body ache and hot and cold with sweats. The patient denies dizziness, loss of balance, numbness of extremities, speech difficulties, vision problems, vomiting in the early morning and worsening school/work performance. Other associated symptoms include fatigue and fever.  Patient denies abdominal pain, appetite decrease, chest pain, cough, diarrhea, dizziness, ear pulling, hoarseness, irritability, nasal congestion, nausea, neck stiffness, photophobia, rash, rhinorrhea, sneezing, sore throat, vomiting and wheezing. Home treatment has included ibuprofen with unsatisfactory improvement. Other history includes: Diabetes type 2. Family history includes no known family members with significant headaches.  The following portions of the patient's history were reviewed and updated as appropriate: allergies, current medications, past family history, past medical history, past social history, past surgical history and problem list.    Review of  "Systems  Pertinent items are noted in HPI.      Objective:   /78  Pulse 86  Temp 98.5  F (36.9  C) (Oral)   Ht 5' 11\" (1.803 m)  Wt (!) 246 lb 6.4 oz (111.8 kg)  SpO2 97%  BMI 34.37 kg/m2  General appearance: alert, appears stated age and cooperative  Head: Normocephalic, without obvious abnormality, atraumatic  Eyes: conjunctivae/corneas clear. PERRL, EOM's intact. Fundi benign.  Ears: normal TM's and external ear canals both ears  Nose: Nares normal. Septum midline. Mucosa normal. No drainage or sinus tenderness.  Throat: lips, mucosa, and tongue normal; teeth and gums normal  Neck: no adenopathy, no carotid bruit, no JVD, supple, symmetrical, trachea midline and thyroid not enlarged, symmetric, no tenderness/mass/nodules  Lungs: clear to auscultation bilaterally  Heart: regular rate and rhythm, S1, S2 normal, no murmur, click, rub or gallop  Pulses: 2+ and symmetric  Skin: Clammy  Lymph nodes: Cervical, supraclavicular, and axillary nodes normal.  Neurologic: Grossly normal      "

## 2021-06-23 NOTE — PATIENT INSTRUCTIONS - HE
You were seen today for a sinus infection.    Symptoms management:  - May use Tylenol or Ibuprofen for discomfort and/or fever if present  - May try saline irrigation to relieve congestion (see instructions below)  - Use of nasal steroids (Flonase) as prescribed  - Take antibiotics as prescribed for the full course (even if symptoms have improved)  - If you are experiencing ear fullness, may try an oral decongestant such as sudafed    Reasons to come back for re-evaluation:  - Develop a fever of 100.4F or current fever worsens  - Troubles seeing or double vision  - Swelling or redness around one or both eyes  - Sfiff neck  - Symptoms are not improving over the next 3 days    Buffered normal saline nasal irrigation   The benefits   1. Saline (saltwater) washes the mucus and irritants from your nose.   2. The sinus passages are moisturized.   3. Studies have also shown that a nasal irrigation improves cell function (the cells that move the mucus work better).   The recipe   Use a one-quart glass jar that is thoroughly cleansed.   You may use a large medical syringe (30 cc), water pick with an irrigation tip (preferred method), squeeze bottle, or Neti pot. Do not use a baby bulb syringe. The syringe or pick should be sterilized frequently or replaced every two to three weeks to avoid contamination and infection.   Fill with water that has been distilled, previously boiled, or otherwise sterilized. Plain tap water is not recommended, because it is not necessarily sterile.   Add 1 to 1  heaping teaspoons of pickling/killian salt. Do not use table salt, because it contains a large number of additives.   Add 1 teaspoon of baking soda (pure bicarbonate).   Mix ingredients together, and store at room temperature. Discard after one week.   You may also make up a solution from premixed packets that are commercially prepared specifically for nasal irrigation.   The instructions   Irrigate your nose with saline one to two times  per day.   If you have been told to use nasal medication, you should always use your saline solution first. The nasal medication is much more effective when sprayed onto clean nasal membranes, and the spray will reach deeper into the nose.   Pour the amount of fluid you plan to use into a clean bowl. Do not put your used syringe back into the storage container, because it contaminates your solution.   You may warm the solution slightly in the microwave, but be sure that the solution is not hot.   Bend over the sink (some people do this in the shower) and squirt the solution into each side of your nose, aiming the stream toward the back of your head, not the top of your head. The solution should flow into one nostril and out of the other, but it will not harm you if you swallow a little.   Some people experience a little burning sensation the first few times they use buffered saline solution, but this usually goes away after they adapt to it.

## 2021-06-23 NOTE — PROGRESS NOTES
Assessment:       Acute bacterial sinusitis.      Plan:       Sudafed.  Augmentin.  Use of at home nasal steroids twice a day for 7 days.  Nasal saline rinses as needed for congestion.  Discussed signs of worsening infection and when to follow-up with PCP if no symptom improvement.       Patient Instructions   You were seen today for a sinus infection.    Symptoms management:  - May use Tylenol or Ibuprofen for discomfort and/or fever if present  - May try saline irrigation to relieve congestion (see instructions below)  - Use of nasal steroids (Flonase) as prescribed  - Take antibiotics as prescribed for the full course (even if symptoms have improved)  - If you are experiencing ear fullness, may try an oral decongestant such as sudafed    Reasons to come back for re-evaluation:  - Develop a fever of 100.4F or current fever worsens  - Troubles seeing or double vision  - Swelling or redness around one or both eyes  - Sfiff neck  - Symptoms are not improving over the next 3 days    Buffered normal saline nasal irrigation   The benefits   1. Saline (saltwater) washes the mucus and irritants from your nose.   2. The sinus passages are moisturized.   3. Studies have also shown that a nasal irrigation improves cell function (the cells that move the mucus work better).   The recipe   Use a one-quart glass jar that is thoroughly cleansed.   You may use a large medical syringe (30 cc), water pick with an irrigation tip (preferred method), squeeze bottle, or Neti pot. Do not use a baby bulb syringe. The syringe or pick should be sterilized frequently or replaced every two to three weeks to avoid contamination and infection.   Fill with water that has been distilled, previously boiled, or otherwise sterilized. Plain tap water is not recommended, because it is not necessarily sterile.   Add 1 to 1  heaping teaspoons of pickling/killian salt. Do not use table salt, because it contains a large number of additives.   Add 1  teaspoon of baking soda (pure bicarbonate).   Mix ingredients together, and store at room temperature. Discard after one week.   You may also make up a solution from premixed packets that are commercially prepared specifically for nasal irrigation.   The instructions   Irrigate your nose with saline one to two times per day.   If you have been told to use nasal medication, you should always use your saline solution first. The nasal medication is much more effective when sprayed onto clean nasal membranes, and the spray will reach deeper into the nose.   Pour the amount of fluid you plan to use into a clean bowl. Do not put your used syringe back into the storage container, because it contaminates your solution.   You may warm the solution slightly in the microwave, but be sure that the solution is not hot.   Bend over the sink (some people do this in the shower) and squirt the solution into each side of your nose, aiming the stream toward the back of your head, not the top of your head. The solution should flow into one nostril and out of the other, but it will not harm you if you swallow a little.   Some people experience a little burning sensation the first few times they use buffered saline solution, but this usually goes away after they adapt to it.          Subjective:       Eddie Kelley is a 38 y.o. male who presents for evaluation of possible sinus infection. Symptoms include fever of 101 max, body aches, headaches, chills, night sweats, and nausea. Onset of symptoms was 2 weeks ago, with gradual improvement until 2 days ago when symptom worsened rapidly.  He is drinking plenty of fluids. Past history is significant for sinus infections occuring once a year. Patient is a former smoker.     The following portions of the patient's history were reviewed and updated as appropriate: allergies, current medications and problem list.    Review of Systems  Pertinent items are noted in  HPI.    Allergies  Allergies   Allergen Reactions     Bydureon [Exenatide Microspheres] Rash     Reaction to microspheres in drug formulation         Objective:       /83 (Patient Site: Left Arm, Patient Position: Lying, Cuff Size: Adult Large)   Pulse 88   Temp 98.7  F (37.1  C) (Oral)   Resp 14   Wt (!) 252 lb 4 oz (114.4 kg)   SpO2 95%   BMI 35.18 kg/m    General appearance: alert, appears stated age, cooperative, no distress and non-toxic  Head: Normocephalic, without obvious abnormality, atraumatic, sinuses tender to percussion  Ears: TM's intact with mucoid fluid and buling, no erythema; external ears normal  Nose: no discharge  Throat: post-nasal drip seen, mild tonsil swelling with no erythema or exudate; MMM, lips and tongue normal  Neck: mild anterior cervical adenopathy and supple, symmetrical, trachea midline  Lungs: clear to auscultation bilaterally  Heart: regular rate and rhythm, S1, S2 normal, no murmur, click, rub or gallop

## 2021-06-24 NOTE — PROGRESS NOTES
MTM Follow Up Encounter  Assessment & Plan                                                     1. Type 2 diabetes mellitus   Due for A1c today. Goal of <7%. Currently on metformin and GLP-1 agonist with reported BG generally within goal range. There is room to increase Trulicity dose if needed. He is interested in a continuous glucose monitor, so discussed FreeStyle Allison 14 day system and provided information on use. Rx sent to patient's pharmacy.   Blood pressure is well controlled and meeting goal of <140/90 mm Hg per JNC-8 hypertension guidelines without medication. No microalbuminuria. No statin indicated as patient is <40 years old and previous LDL of 115 mg/dl.   Plan   1. Check A1c.  2. FreeStyle Allison CGM.     2. Vitamin D deficiency  Most recent level close to goal range. Pending next level, may need to increase dose of vitamin D.       Follow Up  Return in about 6 months (around 8/13/2019).      Subjective & Objective                                                       Eddie Kelley is a 38 y.o. male coming in for a follow up visit for Medication Therapy Management. He was referred to me from OLIVE Zurita    Chief Complaint: Diabetes follow up  Medication Adherence/Access: Good; no issues identified.     1. Type 2 diabetes mellitus   Rodriguez is taking metformin ER 2000 mg daily and Trulicity 0.75 mg SQ weekly. He does check his BG regularly. Average 147 with 84% in range in the past 30 days. No hypoglycemia. He was previously on Bydureon, but reacted to the microspheres in the formulation. He varies the time he checks - sometimes pre meal and sometimes post-meal.     Lab Results   Component Value Date    HGBA1C 6.6 (H) 08/08/2018    HGBA1C 6.5 (H) 02/05/2018    HGBA1C 6.5 (H) 09/06/2017     Lab Results   Component Value Date    MICROALBUR <0.50 02/05/2018    LDLCALC 112 08/08/2018    CREATININE 0.77 08/08/2018       2. Vitamin D deficiency  Rodriguez takes vitamin D3 1,000 IU, but only during  winter months as he is out in the sun quite a bit in the summer. He also takes a multivitmain every other day.     Vitamin D, Total (25-Hydroxy)   Date Value Ref Range Status   2018 29.6 (L) 30.0 - 80.0 ng/mL Final         PMH: reviewed in EPIC   Allergies/ADRs: reviewed in EPIC   Alcohol: reviewed in EPIC   Tobacco:   Social History     Tobacco Use   Smoking Status Former Smoker     Years: 10.00     Last attempt to quit: 2005     Years since quittin.1   Smokeless Tobacco Former User     Today's Vitals:   BP Readings from Last 3 Encounters:   19 133/86   01/15/19 132/83   18 132/78     Pulse Readings from Last 3 Encounters:   19 85   01/15/19 88   18 86     Wt Readings from Last 3 Encounters:   01/15/19 (!) 252 lb (114.3 kg)   01/15/19 (!) 252 lb 4 oz (114.4 kg)   18 (!) 246 lb 6.4 oz (111.8 kg)     ----------------    Much or all of the text in this note was generated through the use of Dragon Dictate voice-to-text software. Errors in spelling or words which seem out of context are unintentional. Sound alike errors, in particular, may have escaped editing.    The patient was given a summary of these recommendations via BUX    I spent 30 minutes with this patient today;   All changes were made via collaborative practice agreement with OLIVE Zurita. A copy of the visit note was provided to the patient's provider.     Giselle Mayes, PharmD, BCACP  Medication Management Pharmacist  [facility]          Current Outpatient Medications   Medication Sig Dispense Refill     C/Ech/StJwort/Eldr/Sging/hrb30 (COLD AND FLU FIGHTER ORAL) Take by mouth.       cholecalciferol, vitamin D3, 1,000 unit tablet Take 1,000 Units by mouth daily.       dulaglutide (TRULICITY) 0.75 mg/0.5 mL PnIj Inject 0.75 mg under the skin once a week. 6 mL 3     flash glucose scanning reader (Reachpod - Inovaktif Bilisim VARUN 14 DAY READER) Misc Use 1 application As Directed daily. 1 each  0     flash glucose sensor (FREESTYLE VARUN 14 DAY SENSOR) Kit Use 1 application As Directed every 8 (eight) hours. 2 kit 11     metFORMIN (GLUCOPHAGE-XR) 500 MG 24 hr tablet Take 2 tablets (1,000 mg total) by mouth 2 (two) times a day. 360 tablet 3     MULTIVITAMIN ORAL Take 1 tablet by mouth every other day.       No current facility-administered medications for this visit.

## 2021-06-24 NOTE — PROGRESS NOTES
Assessment:   1. Type 2 diabetes mellitus without complication, without long-term current use of insulin (H)   Diabetes mellitus Type II, under poor control. Recent weight gain was addressed. Also discussed poor management of blood sugar. Patient will follow up in 3 months.   - Glycosylated Hemoglobin A1c  - Microalbumin, Random Urine     Plan:   Discussed general issues about diabetes pathophysiology and management.  Counseling at today's visit: discussed the need for weight loss, set a weight loss goal of 10 lbs over the next 6 months, focused on the need for regular aerobic exercise, focused on the need to adhere to the prescribed ADA diet, discussed the advantages of a diet low in carbohydrates, reminded to check sugars regularly and to bring readings in at the time of the next visit and discussed management of hypoglycemic episodes.     Subjective:   Eddie Kelley is a 38 y.o. male who presents for follow up of diabetes.. Current symptoms include: none. Patient denies foot ulcerations, hyperglycemia, hypoglycemia , increased appetite, nausea, paresthesia of the feet, polyuria, visual disturbances, vomiting and weight loss. Evaluation to date has been: fasting blood sugar, fasting lipid panel, hemoglobin A1C and microalbuminuria. Home sugars: BGs consistently in an acceptable range. Current treatments: Continued sulfonylurea which has been effective and Continued metformin which has been effective. Last dilated eye exam 2019.    The following portions of the patient's history were reviewed and updated as appropriate: allergies, current medications, past family history, past medical history, past social history, past surgical history and problem list.    Review of Systems  Pertinent items are noted in HPI.      Objective:   /76 (Patient Site: Right Arm, Patient Position: Sitting, Cuff Size: Adult Regular)   Pulse 68   Wt (!) 256 lb 2 oz (116.2 kg)   SpO2 98%   BMI 35.72 kg/m    General  appearance: alert, appears stated age and cooperative  Eyes: conjunctivae/corneas clear. PERRL, EOM's intact. Fundi benign.  Extremities: extremities normal, atraumatic, no cyanosis or edema  Pulses: 2+ and symmetric  Neurologic: Grossly normal      [unfilled]    Patient was not evaluated for proper footwear and sizing.    Laboratory:  No components found for: A1C

## 2021-06-25 NOTE — TELEPHONE ENCOUNTER
RN cannot approve Refill Request    RN can NOT refill this medication   Patient request early refill. Medication last filled 5/5/21 for qty 6 ml refill 0. Provider to advise on request. . Last office visit: Visit date not found Last Physical: Visit date not found Last MTM visit: Visit date not found Last visit same specialty: 12/22/2020 Lucia Werner FNP.  Next visit within 3 mo: Visit date not found  Next physical within 3 mo: Visit date not found      Gumaro Mendoza, [facility] Connection Triage/Med Refill 6/9/2021    Requested Prescriptions   Pending Prescriptions Disp Refills     TRULICITY 0.75 mg/0.5 mL PnIj [Pharmacy Med Name: TRULICITY 0.75MG/0.5ML SDP 0.5ML] 6 mL 0     Sig: ADMINISTER 0.75 MG UNDER THE SKIN 1 TIME A WEEK       Insulin/GLP-1 Refill Protocol Passed - 6/8/2021  8:01 AM        Passed - Visit with PCP or prescribing provider visit in last 6 months     Last office visit with prescriber/PCP: Visit date not found OR same dept: 12/22/2020 Lucia Werner FNP OR same specialty: 12/22/2020 Lucia Werner FNP Last physical: Visit date not found Last MTM visit: Visit date not found     Next appt within 3 mo: Visit date not found  Next physical within 3 mo: Visit date not found  Prescriber OR PCP: Pretty Starkey NP  Last diagnosis associated with med order: 1. Type 2 diabetes mellitus (H)  - TRULICITY 0.75 mg/0.5 mL PnIj [Pharmacy Med Name: TRULICITY 0.75MG/0.5ML SDP 0.5ML]; ADMINISTER 0.75 MG UNDER THE SKIN 1 TIME A WEEK  Dispense: 6 mL; Refill: 0    2. Morbid obesity (H)  - TRULICITY 0.75 mg/0.5 mL PnIj [Pharmacy Med Name: TRULICITY 0.75MG/0.5ML SDP 0.5ML]; ADMINISTER 0.75 MG UNDER THE SKIN 1 TIME A WEEK  Dispense: 6 mL; Refill: 0    If protocol passes may refill for 6 months if within 3 months of last provider visit (or a total of 9 months).              Passed - A1C in last 6 months     Hemoglobin A1c   Date Value Ref Range Status   12/22/2020 7.1 (H) <=5.6 % Final                Passed - Microalbumin in last year     Microalbumin, Random Urine   Date Value Ref Range Status   07/15/2020 <0.50 0.00 - 1.99 mg/dL Final                  Passed - Blood pressure in last year     BP Readings from Last 1 Encounters:   12/22/20 130/82             Passed - Creatinine done in last year     Creatinine   Date Value Ref Range Status   07/15/2020 0.85 0.70 - 1.30 mg/dL Final

## 2021-06-27 NOTE — PROGRESS NOTES
Progress Notes by Lucia Werner FNP at 6/17/2019 10:50 AM     Author: Lucia Werner FNP Service: -- Author Type: Nurse Practitioner    Filed: 6/17/2019  1:30 PM Encounter Date: 6/17/2019 Status: Signed    : Lucia Werner FNP (Nurse Practitioner)       MALE PREVENTATIVE EXAM    Assessment and Plan:   1. Annual physical exam  Healthy male exam  - Glycosylated Hemoglobin A1c  - Microalbumin, Random Urine  - Lipid Cascade  - Comprehensive Metabolic Panel  - Hemoglobin    2. Type 2 diabetes mellitus without complication, without long-term current use of insulin (H)  Improved control with current medication.   - Glycosylated Hemoglobin A1c  - Microalbumin, Random Urine  - Comprehensive Metabolic Panel  - Thyroid Stimulating Hormone (TSH)  - Vitamin D, Total (25-Hydroxy)    3. Obesity (BMI 35.0-39.9) with comorbidity (H)  Lost over 8 pounds since his last visit.   - Thyroid Stimulating Hormone (TSH)    Discuss testing for pulmonary sarcoidosis. Recommend that patient return with symptoms.   Next follow up:  No follow-ups on file.    Immunization Review  Adult Imm Review: No immunizations due today    I discussed the following with the patient:   Adult Healthy Living: Importance of regular exercise      Subjective:   Chief Complaint: Eddie Kelley is an 38 y.o. male here for a preventative health visit.     HPI:  Patient reports that few members of his extended family have recently been diagnosed with pulmonary sarcoidosis including her mother and he was wondering if he need to be tested.  He does not have any symptoms including cough, chest pain shortness of breath fever and chills.     Healthy Habits  Are you taking a daily aspirin? No  Do you typically exercising at least 40 min, 3-4 times per week?  Yes  Do you usually eat at least 4 servings of fruit and vegetables a day, include whole grains and fiber and avoid regularly eating high fat foods? Yes  Have you had an eye exam in the past  "two years? Yes  Do you see a dentist twice per year? Yes  Do you have any concerns regarding sleep? No    Safety Screen  If you own firearms, are they secured in a locked gun cabinet or with trigger locks? Yes  Do you feel you are safe where you are living?: Yes (2/13/2019  9:16 AM)  Do you feel you are safe in your relationship(s)?: Yes (2/13/2019  9:16 AM)      Review of Systems:  Please see above.  The rest of the review of systems are negative for all systems.     Cancer Screening     Patient has no health maintenance due at this time          Patient Care Team:  Lucia Werner FNP as PCP - General (Nurse Practitioner)  Giselle aMyes PharmD as Pharmacist (Pharmacist)        History     Not marked as reviewed during this visit.            Objective:   Vital Signs:   Visit Vitals  /76   Pulse (!) 59   Ht 5' 11\" (1.803 m)   Wt (!) 247 lb (112 kg)   SpO2 99%   BMI 34.45 kg/m           PHYSICAL EXAM  General appearance: alert, appears stated age and cooperative  Head: Normocephalic, without obvious abnormality, atraumatic  Eyes: conjunctivae/corneas clear. PERRL, EOM's intact. Fundi benign.  Ears: normal TM's and external ear canals both ears  Throat: lips, mucosa, and tongue normal; teeth and gums normal  Neck: no adenopathy, no carotid bruit, no JVD, supple, symmetrical, trachea midline and thyroid not enlarged, symmetric, no tenderness/mass/nodules  Back: symmetric, no curvature. ROM normal. No CVA tenderness.  Lungs: clear to auscultation bilaterally  Chest wall: no tenderness  Heart: regular rate and rhythm, S1, S2 normal, no murmur, click, rub or gallop  Abdomen: soft, non-tender; bowel sounds normal; no masses,  no organomegaly  Extremities: extremities normal, atraumatic, no cyanosis or edema  Pulses: 2+ and symmetric  Skin: Skin color, texture, turgor normal. No rashes or lesions  Lymph nodes: Cervical, supraclavicular, and axillary nodes normal.  Neurologic: Grossly normal           "   Medication List           Accurate as of 6/17/19  1:29 PM. If you have any questions, ask your nurse or doctor.               CONTINUE taking these medications    cholecalciferol (vitamin D3) 1,000 unit tablet  INSTRUCTIONS:  Take 2,000 Units by mouth daily.        dulaglutide 0.75 mg/0.5 mL Pnij  Also known as:  TRULICITY  INSTRUCTIONS:  Inject 0.75 mg under the skin once a week.        flash glucose sensor Kit  Also known as:  FREESTYLE VARUN 14 DAY SENSOR  INSTRUCTIONS:  Use 1 application As Directed every 8 (eight) hours.        FREESTYLE VARUN 14 DAY READER Misc  INSTRUCTIONS:  USE ONE APPLICATION AS DIRECTED DAILY  Generic drug:  flash glucose scanning reader        metFORMIN 500 MG 24 hr tablet  Also known as:  GLUCOPHAGE-XR  INSTRUCTIONS:  Take 2 tablets (1,000 mg total) by mouth 2 (two) times a day.        MULTIVITAMIN ORAL  INSTRUCTIONS:  Take 1 tablet by mouth every other day.           STOP taking these medications    COLD AND FLU FIGHTER ORAL  Stopped by:  Giselle Mayes, PharmD            Additional Screenings Completed Today:

## 2021-08-07 ENCOUNTER — HEALTH MAINTENANCE LETTER (OUTPATIENT)
Age: 41
End: 2021-08-07

## 2021-09-20 ENCOUNTER — MYC MEDICAL ADVICE (OUTPATIENT)
Dept: FAMILY MEDICINE | Facility: CLINIC | Age: 41
End: 2021-09-20

## 2021-09-20 DIAGNOSIS — E11.9 TYPE 2 DIABETES MELLITUS WITHOUT COMPLICATION, WITHOUT LONG-TERM CURRENT USE OF INSULIN (H): Primary | ICD-10-CM

## 2021-09-20 RX ORDER — DULAGLUTIDE 0.75 MG/.5ML
INJECTION, SOLUTION SUBCUTANEOUS
COMMUNITY
Start: 2021-06-09 | End: 2021-09-20

## 2021-09-20 RX ORDER — DULAGLUTIDE 0.75 MG/.5ML
0.75 INJECTION, SOLUTION SUBCUTANEOUS
Qty: 0.5 ML | Refills: 3 | Status: SHIPPED | OUTPATIENT
Start: 2021-09-20

## 2021-10-02 ENCOUNTER — HEALTH MAINTENANCE LETTER (OUTPATIENT)
Age: 41
End: 2021-10-02

## 2021-11-08 ENCOUNTER — MYC MEDICAL ADVICE (OUTPATIENT)
Dept: FAMILY MEDICINE | Facility: CLINIC | Age: 41
End: 2021-11-08
Payer: COMMERCIAL

## 2021-12-22 ENCOUNTER — VIRTUAL VISIT (OUTPATIENT)
Dept: INTERNAL MEDICINE | Facility: CLINIC | Age: 41
End: 2021-12-22
Payer: COMMERCIAL

## 2021-12-22 DIAGNOSIS — E11.9 TYPE 2 DIABETES MELLITUS WITHOUT COMPLICATION, WITHOUT LONG-TERM CURRENT USE OF INSULIN (H): ICD-10-CM

## 2021-12-22 PROCEDURE — 99214 OFFICE O/P EST MOD 30 MIN: CPT | Mod: GT | Performed by: INTERNAL MEDICINE

## 2021-12-22 RX ORDER — METFORMIN HCL 500 MG
TABLET, EXTENDED RELEASE 24 HR ORAL
COMMUNITY
Start: 2021-11-11

## 2021-12-22 NOTE — PROGRESS NOTES
Rodriguez is a 41 year old who is being evaluated via a billable video visit.      How would you like to obtain your AVS? MyChart  If the video visit is dropped, the invitation should be resent by: Text to cell phone: [phone]  Will anyone else be joining your video visit? No    Video Start Time: 7.11    Assessment & Plan     Type 2 diabetes mellitus without complication, without long-term current use of insulin (H), patient came today, for a video visit, because he usually attends his primary, every 6 months, for his diabetes mellitus.  His HbA1c is 7.1%, June 2021.  Microalbumin is not elevated.  Patient is not on a cholesterol-lowering medication.  I discussed this with him today, and he will think about this.  He was previously talking with Giselle Velazquez, Pharm D.  He says his sugar readings have been running higher than usual, particularly in the morning.  He has a jacque sensor, he tells me that his sugar readings have been 160-250 in the mornings.  Usually these were .  Readings later on in the day are better at 160-170.  Patient is taking Metformin, 2000 mg in the morning.  I suggested that he split this up and take 1000 mg in the morning and 1000 mg at suppertime.  I have increased his Trulicity to 1.5 mg subcutaneous every 7 days, from 0.75 mg subcutaneous.  I have sent this to his pharmacy.  Patient is still thinking about who his primary will be.    - dulaglutide (TRULICITY) 1.5 MG/0.5ML pen  Dispense: 6 mL; Refill: 3    {   MEDICATIONS:   Orders Placed This Encounter   Medications     metFORMIN (GLUCOPHAGE-XR) 500 MG 24 hr tablet     dulaglutide (TRULICITY) 1.5 MG/0.5ML pen     Sig: Inject 1.5 mg Subcutaneous every 7 days     Dispense:  6 mL     Refill:  3     Medications Discontinued During This Encounter   Medication Reason     azithromycin (ZITHROMAX) 250 MG tablet      triamcinolone (KENALOG) 0.1 % ointment           - Continue other medications without change  There are no Patient Instructions  on file for this visit.  Patient will schedule an appointment with a new primary.  He will contact Giselle PATTON as she has worked with him in the past.     No follow-ups on file.    Criss Lassiter MD  [facility]    Subjective   Rodriguez is a 41 year old who presents for the following health issues, by himself. Video visit.     HPI   Every 6 months, sees someone for diabetes.Trending up, last few months.     Three times a day. Allison sensor. Morning usually worse. Used to run in the , now 150-250. 160-170 in the afternoon. HBA1C 7.1%    124, LDL 6/23/2021. Patient will think about this.     Review of Systems   As above.      Objective           Vitals:  No vitals were obtained today due to virtual visit.    Physical Exam   GENERAL: Healthy, alert and no distress  EYES: Eyes grossly normal to inspection.  No discharge or erythema, or obvious scleral/conjunctival abnormalities.  RESP: No audible wheeze, cough, or visible cyanosis.  No visible retractions or increased work of breathing.    SKIN: Visible skin clear. No significant rash, abnormal pigmentation or lesions.  NEURO: Cranial nerves grossly intact.  Mentation and speech appropriate for age.  PSYCH: Mentation appears normal, affect normal/bright, judgement and insight intact, normal speech and appearance well-groomed.        Video-Visit Details    Type of service:  Video Visit    Video End Time:7.26    Originating Location (pt. Location): Home    Distant Location (provider location):  [facility]     Platform used for Video Visit: Praveena

## 2021-12-31 ENCOUNTER — LAB (OUTPATIENT)
Dept: LAB | Facility: CLINIC | Age: 41
End: 2021-12-31
Payer: COMMERCIAL

## 2021-12-31 DIAGNOSIS — E11.9 TYPE 2 DIABETES MELLITUS WITHOUT COMPLICATION, WITHOUT LONG-TERM CURRENT USE OF INSULIN (H): ICD-10-CM

## 2021-12-31 LAB
ANION GAP SERPL CALCULATED.3IONS-SCNC: 11 MMOL/L (ref 5–18)
BUN SERPL-MCNC: 15 MG/DL (ref 8–22)
CALCIUM SERPL-MCNC: 9.7 MG/DL (ref 8.5–10.5)
CHLORIDE BLD-SCNC: 101 MMOL/L (ref 98–107)
CO2 SERPL-SCNC: 28 MMOL/L (ref 22–31)
CREAT SERPL-MCNC: 0.99 MG/DL (ref 0.7–1.3)
GFR SERPL CREATININE-BSD FRML MDRD: >90 ML/MIN/1.73M2
GLUCOSE BLD-MCNC: 164 MG/DL (ref 70–125)
HBA1C MFR BLD: 7.8 % (ref 0–5.6)
POTASSIUM BLD-SCNC: 4.5 MMOL/L (ref 3.5–5)
SODIUM SERPL-SCNC: 140 MMOL/L (ref 136–145)

## 2021-12-31 PROCEDURE — 80048 BASIC METABOLIC PNL TOTAL CA: CPT

## 2021-12-31 PROCEDURE — 83036 HEMOGLOBIN GLYCOSYLATED A1C: CPT

## 2021-12-31 PROCEDURE — 36415 COLL VENOUS BLD VENIPUNCTURE: CPT

## 2022-01-04 NOTE — RESULT ENCOUNTER NOTE
Trey your HBA1C which looks at your average sugar readings, over the prior 3 months, is 7.8%, this is slightly more than it was in the past, 7.1% June 2021.  We increased your Trulicity to 1.5 mg from 0.75 mg, when I saw you, December 22, 2021, hopefully this will improve your sugar readings and your HbA1c.  Your goal HbA1c, it is closer to 7%.  Your kidney function is normal.    Thank you,    Criss Lassiter MD

## 2022-02-11 DIAGNOSIS — E11.9 TYPE 2 DIABETES, HBA1C GOAL < 7% (H): Primary | ICD-10-CM

## 2022-03-14 DIAGNOSIS — E11.9 TYPE 2 DIABETES MELLITUS WITHOUT COMPLICATION, WITHOUT LONG-TERM CURRENT USE OF INSULIN (H): Primary | ICD-10-CM

## 2022-03-15 NOTE — TELEPHONE ENCOUNTER
Outpatient Medication Detail     Disp Refills Start End ROMAIN   flash glucose sensor Kit 2 kit 11 3/25/2021  No   Sig - Route: Inject 1 kit under the skin every 14 (fourteen) days. - Subcutaneous   Sent to pharmacy as: flash glucose sensor kit   E-Prescribing Status: Receipt confirmed by pharmacy (3/25/2021 10:52 AM CDT)       flash glucose sensor Kit [[id number]]    Electronically signed by: Pretty Starkey NP on 03/25/21 1052 Status: Active   Ordering user: Pretty Starkey NP 03/25/21 1052 Authorized by: Pretty Starkey NP   Frequency: Q14 Days 03/25/21 - Until Discontinued   Diagnoses  Type 2 diabetes mellitus without complication, without long-term current use of insulin (H) [E11.9]     Former patient of ??? & has not established care with another provider.  Please assign refill request to covering provider per clinic standard process.    Routing refill request to provider for review/approval because:  Drug not on the [facility] refill protocol   No PCP    Last office visit provider:  12/22/21     Requested Prescriptions   Pending Prescriptions Disp Refills     Continuous Blood Gluc Sensor (FREESTYLE VARUN 14 DAY SENSOR) MISC [Pharmacy Med Name: FREESTYLE VARUN 14DAY SENSOR]       Sig: INJECT 1 KIT UNDER THE SKIN EVERY 14 DAYS       There is no refill protocol information for this order          Gumaro Mendoza RN 03/15/22 7:24 AM

## 2022-03-17 RX ORDER — FLASH GLUCOSE SENSOR
KIT MISCELLANEOUS
Qty: 2 EACH | Refills: 11 | Status: SHIPPED | OUTPATIENT
Start: 2022-03-17

## 2022-07-03 ENCOUNTER — HEALTH MAINTENANCE LETTER (OUTPATIENT)
Age: 42
End: 2022-07-03

## 2023-01-14 ENCOUNTER — HEALTH MAINTENANCE LETTER (OUTPATIENT)
Age: 43
End: 2023-01-14

## 2023-06-02 ENCOUNTER — HEALTH MAINTENANCE LETTER (OUTPATIENT)
Age: 43
End: 2023-06-02

## 2023-07-22 ENCOUNTER — HEALTH MAINTENANCE LETTER (OUTPATIENT)
Age: 43
End: 2023-07-22

## 2024-02-17 ENCOUNTER — HEALTH MAINTENANCE LETTER (OUTPATIENT)
Age: 44
End: 2024-02-17

## 2024-06-30 ENCOUNTER — HEALTH MAINTENANCE LETTER (OUTPATIENT)
Age: 44
End: 2024-06-30

## 2024-09-08 ENCOUNTER — HEALTH MAINTENANCE LETTER (OUTPATIENT)
Age: 44
End: 2024-09-08